# Patient Record
Sex: FEMALE | Race: WHITE | NOT HISPANIC OR LATINO | Employment: OTHER | URBAN - METROPOLITAN AREA
[De-identification: names, ages, dates, MRNs, and addresses within clinical notes are randomized per-mention and may not be internally consistent; named-entity substitution may affect disease eponyms.]

---

## 2017-04-06 ENCOUNTER — HOSPITAL ENCOUNTER (OUTPATIENT)
Dept: RADIOLOGY | Facility: MEDICAL CENTER | Age: 67
End: 2017-04-06

## 2017-04-06 ENCOUNTER — APPOINTMENT (OUTPATIENT)
Dept: RADIOLOGY | Facility: MEDICAL CENTER | Age: 67
DRG: 419 | End: 2017-04-06
Attending: EMERGENCY MEDICINE
Payer: MEDICARE

## 2017-04-06 ENCOUNTER — HOSPITAL ENCOUNTER (INPATIENT)
Facility: MEDICAL CENTER | Age: 67
LOS: 6 days | DRG: 419 | End: 2017-04-12
Attending: EMERGENCY MEDICINE | Admitting: SURGERY
Payer: MEDICARE

## 2017-04-06 DIAGNOSIS — K81.0 ACUTE CHOLECYSTITIS: ICD-10-CM

## 2017-04-06 DIAGNOSIS — E80.6 HYPERBILIRUBINEMIA: ICD-10-CM

## 2017-04-06 LAB
ABO GROUP BLD: NORMAL
ABO GROUP BLD: NORMAL
ALBUMIN SERPL BCP-MCNC: 3.5 G/DL (ref 3.2–4.9)
ALBUMIN/GLOB SERPL: 1.3 G/DL
ALP SERPL-CCNC: 182 U/L (ref 30–99)
ALT SERPL-CCNC: 124 U/L (ref 2–50)
ANION GAP SERPL CALC-SCNC: 9 MMOL/L (ref 0–11.9)
APTT PPP: 26.5 SEC (ref 24.7–36)
AST SERPL-CCNC: 152 U/L (ref 12–45)
BASOPHILS # BLD AUTO: 0.3 % (ref 0–1.8)
BASOPHILS # BLD: 0.02 K/UL (ref 0–0.12)
BILIRUB SERPL-MCNC: 9.4 MG/DL (ref 0.1–1.5)
BLD GP AB SCN SERPL QL: NORMAL
BUN SERPL-MCNC: 25 MG/DL (ref 8–22)
CALCIUM SERPL-MCNC: 8.9 MG/DL (ref 8.5–10.5)
CHLORIDE SERPL-SCNC: 99 MMOL/L (ref 96–112)
CO2 SERPL-SCNC: 22 MMOL/L (ref 20–33)
CREAT SERPL-MCNC: 1.21 MG/DL (ref 0.5–1.4)
EOSINOPHIL # BLD AUTO: 0.05 K/UL (ref 0–0.51)
EOSINOPHIL NFR BLD: 0.7 % (ref 0–6.9)
ERYTHROCYTE [DISTWIDTH] IN BLOOD BY AUTOMATED COUNT: 44.9 FL (ref 35.9–50)
GFR SERPL CREATININE-BSD FRML MDRD: 44 ML/MIN/1.73 M 2
GLOBULIN SER CALC-MCNC: 2.8 G/DL (ref 1.9–3.5)
GLUCOSE SERPL-MCNC: 78 MG/DL (ref 65–99)
HCT VFR BLD AUTO: 41.9 % (ref 37–47)
HGB BLD-MCNC: 14 G/DL (ref 12–16)
IMM GRANULOCYTES # BLD AUTO: 0.08 K/UL (ref 0–0.11)
IMM GRANULOCYTES NFR BLD AUTO: 1.1 % (ref 0–0.9)
INR PPP: 0.94 (ref 0.87–1.13)
LYMPHOCYTES # BLD AUTO: 0.63 K/UL (ref 1–4.8)
LYMPHOCYTES NFR BLD: 8.6 % (ref 22–41)
MCH RBC QN AUTO: 31.5 PG (ref 27–33)
MCHC RBC AUTO-ENTMCNC: 33.4 G/DL (ref 33.6–35)
MCV RBC AUTO: 94.4 FL (ref 81.4–97.8)
MONOCYTES # BLD AUTO: 0.69 K/UL (ref 0–0.85)
MONOCYTES NFR BLD AUTO: 9.5 % (ref 0–13.4)
NEUTROPHILS # BLD AUTO: 5.82 K/UL (ref 2–7.15)
NEUTROPHILS NFR BLD: 79.8 % (ref 44–72)
NRBC # BLD AUTO: 0 K/UL
NRBC BLD AUTO-RTO: 0 /100 WBC
PLATELET # BLD AUTO: 180 K/UL (ref 164–446)
PMV BLD AUTO: 9.7 FL (ref 9–12.9)
POTASSIUM SERPL-SCNC: 3.8 MMOL/L (ref 3.6–5.5)
PROT SERPL-MCNC: 6.3 G/DL (ref 6–8.2)
PROTHROMBIN TIME: 12.9 SEC (ref 12–14.6)
RBC # BLD AUTO: 4.44 M/UL (ref 4.2–5.4)
RH BLD: NORMAL
SODIUM SERPL-SCNC: 130 MMOL/L (ref 135–145)
WBC # BLD AUTO: 7.3 K/UL (ref 4.8–10.8)

## 2017-04-06 PROCEDURE — 85610 PROTHROMBIN TIME: CPT

## 2017-04-06 PROCEDURE — 770006 HCHG ROOM/CARE - MED/SURG/GYN SEMI*

## 2017-04-06 PROCEDURE — 302128 INFUSION PUMP: Performed by: SURGERY

## 2017-04-06 PROCEDURE — 85730 THROMBOPLASTIN TIME PARTIAL: CPT

## 2017-04-06 PROCEDURE — A9270 NON-COVERED ITEM OR SERVICE: HCPCS | Performed by: SURGERY

## 2017-04-06 PROCEDURE — 90471 IMMUNIZATION ADMIN: CPT

## 2017-04-06 PROCEDURE — 86850 RBC ANTIBODY SCREEN: CPT

## 2017-04-06 PROCEDURE — 85025 COMPLETE CBC W/AUTO DIFF WBC: CPT

## 2017-04-06 PROCEDURE — 74181 MRI ABDOMEN W/O CONTRAST: CPT

## 2017-04-06 PROCEDURE — 99285 EMERGENCY DEPT VISIT HI MDM: CPT

## 2017-04-06 PROCEDURE — 90670 PCV13 VACCINE IM: CPT | Performed by: SURGERY

## 2017-04-06 PROCEDURE — 86900 BLOOD TYPING SEROLOGIC ABO: CPT

## 2017-04-06 PROCEDURE — 700111 HCHG RX REV CODE 636 W/ 250 OVERRIDE (IP): Performed by: SURGERY

## 2017-04-06 PROCEDURE — 80053 COMPREHEN METABOLIC PANEL: CPT

## 2017-04-06 PROCEDURE — 700102 HCHG RX REV CODE 250 W/ 637 OVERRIDE(OP): Performed by: SURGERY

## 2017-04-06 PROCEDURE — 3E0234Z INTRODUCTION OF SERUM, TOXOID AND VACCINE INTO MUSCLE, PERCUTANEOUS APPROACH: ICD-10-PCS | Performed by: SURGERY

## 2017-04-06 PROCEDURE — 86901 BLOOD TYPING SEROLOGIC RH(D): CPT

## 2017-04-06 PROCEDURE — 700105 HCHG RX REV CODE 258: Performed by: EMERGENCY MEDICINE

## 2017-04-06 PROCEDURE — 700105 HCHG RX REV CODE 258: Performed by: SURGERY

## 2017-04-06 PROCEDURE — 700111 HCHG RX REV CODE 636 W/ 250 OVERRIDE (IP): Performed by: EMERGENCY MEDICINE

## 2017-04-06 RX ORDER — PHENYTOIN SODIUM 100 MG/1
100 CAPSULE, EXTENDED RELEASE ORAL
COMMUNITY

## 2017-04-06 RX ORDER — DIAZEPAM 2 MG/1
2 TABLET ORAL 2 TIMES DAILY PRN
Status: DISCONTINUED | OUTPATIENT
Start: 2017-04-06 | End: 2017-04-12 | Stop reason: HOSPADM

## 2017-04-06 RX ORDER — LORATADINE 10 MG/1
10 TABLET ORAL DAILY
Status: DISCONTINUED | OUTPATIENT
Start: 2017-04-06 | End: 2017-04-12 | Stop reason: HOSPADM

## 2017-04-06 RX ORDER — PHENYTOIN 50 MG/1
100 TABLET, CHEWABLE ORAL
Status: DISCONTINUED | OUTPATIENT
Start: 2017-04-06 | End: 2017-04-12 | Stop reason: HOSPADM

## 2017-04-06 RX ORDER — PREDNISONE 10 MG/1
10 TABLET ORAL PRN
COMMUNITY

## 2017-04-06 RX ORDER — SODIUM CHLORIDE 9 MG/ML
INJECTION, SOLUTION INTRAVENOUS CONTINUOUS
Status: DISCONTINUED | OUTPATIENT
Start: 2017-04-06 | End: 2017-04-12 | Stop reason: HOSPADM

## 2017-04-06 RX ORDER — PREDNISONE 10 MG/1
10 TABLET ORAL
Status: DISCONTINUED | OUTPATIENT
Start: 2017-04-06 | End: 2017-04-12 | Stop reason: HOSPADM

## 2017-04-06 RX ORDER — ONDANSETRON 2 MG/ML
4 INJECTION INTRAMUSCULAR; INTRAVENOUS EVERY 6 HOURS PRN
Status: DISCONTINUED | OUTPATIENT
Start: 2017-04-06 | End: 2017-04-09

## 2017-04-06 RX ORDER — DIAZEPAM 2 MG/1
2 TABLET ORAL EVERY 12 HOURS PRN
Status: ON HOLD | COMMUNITY
End: 2017-04-06

## 2017-04-06 RX ORDER — AMLODIPINE BESYLATE 10 MG/1
10 TABLET ORAL NIGHTLY
Status: DISCONTINUED | OUTPATIENT
Start: 2017-04-06 | End: 2017-04-12 | Stop reason: HOSPADM

## 2017-04-06 RX ORDER — AMLODIPINE BESYLATE 10 MG/1
10 TABLET ORAL DAILY
COMMUNITY

## 2017-04-06 RX ORDER — ATENOLOL 50 MG/1
50 TABLET ORAL
Status: DISCONTINUED | OUTPATIENT
Start: 2017-04-06 | End: 2017-04-12 | Stop reason: HOSPADM

## 2017-04-06 RX ORDER — ATENOLOL 50 MG/1
50 TABLET ORAL DAILY
COMMUNITY

## 2017-04-06 RX ORDER — CEFOTETAN DISODIUM 1 G/10ML
1 INJECTION, POWDER, FOR SOLUTION INTRAMUSCULAR; INTRAVENOUS EVERY 12 HOURS
Status: DISCONTINUED | OUTPATIENT
Start: 2017-04-06 | End: 2017-04-06

## 2017-04-06 RX ORDER — LORATADINE 10 MG/1
10 TABLET ORAL DAILY
COMMUNITY

## 2017-04-06 RX ADMIN — LORATADINE 10 MG: 10 TABLET ORAL at 10:26

## 2017-04-06 RX ADMIN — SODIUM CHLORIDE: 9 INJECTION, SOLUTION INTRAVENOUS at 17:40

## 2017-04-06 RX ADMIN — PIPERACILLIN AND TAZOBACTAM 3.38 G: 3; .375 INJECTION, POWDER, LYOPHILIZED, FOR SOLUTION INTRAVENOUS; PARENTERAL at 08:04

## 2017-04-06 RX ADMIN — PNEUMOCOCCAL 13-VALENT CONJUGATE VACCINE 0.5 ML: 2.2; 2.2; 2.2; 2.2; 2.2; 4.4; 2.2; 2.2; 2.2; 2.2; 2.2; 2.2; 2.2 INJECTION, SUSPENSION INTRAMUSCULAR at 10:27

## 2017-04-06 RX ADMIN — SODIUM CHLORIDE: 9 INJECTION, SOLUTION INTRAVENOUS at 08:04

## 2017-04-06 RX ADMIN — PIPERACILLIN AND TAZOBACTAM 3.38 G: 3; .375 INJECTION, POWDER, LYOPHILIZED, FOR SOLUTION INTRAVENOUS; PARENTERAL at 23:57

## 2017-04-06 RX ADMIN — ATENOLOL 50 MG: 50 TABLET ORAL at 10:26

## 2017-04-06 RX ADMIN — PHENYTOIN 100 MG: 50 TABLET, CHEWABLE ORAL at 20:10

## 2017-04-06 RX ADMIN — AMLODIPINE BESYLATE 10 MG: 10 TABLET ORAL at 20:10

## 2017-04-06 RX ADMIN — PIPERACILLIN AND TAZOBACTAM 3.38 G: 3; .375 INJECTION, POWDER, LYOPHILIZED, FOR SOLUTION INTRAVENOUS; PARENTERAL at 12:12

## 2017-04-06 RX ADMIN — HYDROMORPHONE HYDROCHLORIDE 1 MG: 1 INJECTION, SOLUTION INTRAMUSCULAR; INTRAVENOUS; SUBCUTANEOUS at 12:25

## 2017-04-06 RX ADMIN — PIPERACILLIN AND TAZOBACTAM 3.38 G: 3; .375 INJECTION, POWDER, LYOPHILIZED, FOR SOLUTION INTRAVENOUS; PARENTERAL at 17:38

## 2017-04-06 ASSESSMENT — LIFESTYLE VARIABLES
DO YOU DRINK ALCOHOL: YES
TOTAL SCORE: 0
EVER HAD A DRINK FIRST THING IN THE MORNING TO STEADY YOUR NERVES TO GET RID OF A HANGOVER: NO
CONSUMPTION TOTAL: NEGATIVE
HAVE YOU EVER FELT YOU SHOULD CUT DOWN ON YOUR DRINKING: NO
AVERAGE NUMBER OF DAYS PER WEEK YOU HAVE A DRINK CONTAINING ALCOHOL: 2
EVER_SMOKED: YES
ALCOHOL_USE: YES
HAVE PEOPLE ANNOYED YOU BY CRITICIZING YOUR DRINKING: NO
TOTAL SCORE: 0
AVERAGE NUMBER OF DAYS PER WEEK YOU HAVE A DRINK CONTAINING ALCOHOL: 0
ON A TYPICAL DAY WHEN YOU DRINK ALCOHOL HOW MANY DRINKS DO YOU HAVE: 0
HAVE YOU EVER FELT YOU SHOULD CUT DOWN ON YOUR DRINKING: NO
EVER FELT BAD OR GUILTY ABOUT YOUR DRINKING: NO
TOTAL SCORE: 0
HOW MANY TIMES IN THE PAST YEAR HAVE YOU HAD 5 OR MORE DRINKS IN A DAY: 0
TOTAL SCORE: 0
HAVE PEOPLE ANNOYED YOU BY CRITICIZING YOUR DRINKING: NO
TOTAL SCORE: 0
TOTAL SCORE: 0
CONSUMPTION TOTAL: INCOMPLETE
EVER HAD A DRINK FIRST THING IN THE MORNING TO STEADY YOUR NERVES TO GET RID OF A HANGOVER: NO
EVER FELT BAD OR GUILTY ABOUT YOUR DRINKING: NO

## 2017-04-06 ASSESSMENT — PAIN SCALES - GENERAL
PAINLEVEL_OUTOF10: 0

## 2017-04-06 NOTE — CARE PLAN
Problem: Safety  Goal: Will remain free from injury  Intervention: Provide assistance with mobility  Patient will ask for assistance with equipment for mobility      Problem: Knowledge Deficit  Goal: Knowledge of disease process/condition, treatment plan, diagnostic tests, and medications will improve  Intervention: Assess knowledge level of disease process/condition, treatment plan, diagnostic tests, and medications  Patient given education on ERCP to read through and will ask RN questions

## 2017-04-06 NOTE — PROGRESS NOTES
PT seen and examined  Has cholelithiasis and elevated LFTs  MRCP this AM shows CBD stone  GI consult with GIC placed  Continue IVF  Plan lap joey after ERCP

## 2017-04-06 NOTE — ED NOTES
Courtesy call to floor to notify that pt is on her way up to floor.  Pt in NAD upon leaving floor.

## 2017-04-06 NOTE — PROGRESS NOTES
"Patient resting comfortably this am some RUQ abdominal pain with palpation, denies need for anything for pain. Patient is concerned about re-starting her home medications, would like MD to re-order, RN will discuss with MD upon rounding. Patient going down for MRI this am. Plan of care discussed, all questions answered. /68 mmHg  Pulse 66  Temp(Src) 36.3 °C (97.4 °F)  Resp 18  Ht 1.651 m (5' 5\")  Wt 69.3 kg (152 lb 12.5 oz)  BMI 25.42 kg/m2  SpO2 96%  Breastfeeding? No    "

## 2017-04-06 NOTE — ED NOTES
Pt a transfer in from [facility] for acute choleycystitis.   Had 1gm rocephin, fentanyl 50mcg times 2, last dilaudid 0.5mg at 2235.  Pt states no pain at this time.

## 2017-04-06 NOTE — IP AVS SNAPSHOT
" Home Care Instructions                                                                                                                  Name:Ning Inman  Medical Record Number:[id number]  CSN: [id number]    YOB: 1950   Age: 66 y.o.  Sex: female  HT:1.651 m (5' 5\") WT: 69.3 kg (152 lb 12.5 oz)          Admit Date: 4/6/2017     Discharge Date:   Today's Date: 4/12/2017  Attending Doctor:  Indu Lynn M.D.                  Allergies:  Hyzaar            Discharge Instructions       Discharge Instructions    Discharged to home by car with relative. Discharged via walking, hospital escort: Refused.  Special equipment needed: Walker    Be sure to schedule a follow-up appointment with your primary care doctor or any specialists as instructed.     Discharge Plan:   Smoking Cessation Offered: Patient Counseled  Pneumococcal Vaccine Given - only chart on this line when given: Given (See MAR)  Influenza Vaccine Indication: Patient Refuses    I understand that a diet low in cholesterol, fat, and sodium is recommended for good health. Unless I have been given specific instructions below for another diet, I accept this instruction as my diet prescription.   Other diet: regular    Special Instructions:   DIET: Upon discharge from the hospital you may resume your normal preoperative diet. Depending on how you are feeling and whether you have nausea or not, you may wish to stay with a bland diet for the first few days. However, you can advance this as quickly as you feel ready.     ACTIVITIES: After discharge from the hospital, you may resume full routine activities. However, there should be no heavy lifting (greater than 15 pounds) and no strenuous activities until after your follow-up visit. Otherwise, routine activities of daily living are acceptable.     DRIVING: You may drive whenever you are off pain medications and are able to perform the activities needed to drive, i.e. turning, bending, twisting, etc.     "     BATHING: You may get the wound wet at any time after leaving the hospital. You may shower, but do not submerge in a bath for at least a week. Dressings may come off after 48 hours.     BOWEL FUNCTION: A few patients, after this operation, will develop either frequent or loose stools after meals. This usually corrects itself after a few days, to a few weeks. If this occurs, do not worry; it is not unusual and will resolve. Much more common than loose stools, is constipation. The combination of pain medication and decreased activity level can cause constipation in otherwise normal patients. If you feel this is occurring, take a laxative (Milk of Magnesia, Ex-Lax, Senokot, etc.) until the problem has resolved.     PAIN MEDICATION: You will be given a prescription for pain medication at discharge. Please take these as directed. It is important to remember not to take medications on an empty stomach as this may cause nausea.     CALL IF YOU HAVE: (1) Fevers to more than 1010 F, (2) Unusual chest or leg pain, (3) Drainage or fluid from incision that may be foul smelling, increased tenderness or soreness at the wound or the wound edges are no longer together, redness or swelling at the incision site. Please do not hesitate to call with any other questions.     APPOINTMENT: Contact our office at [phone] for a follow-up appointment in 1 week following your procedure.     · Is patient discharged on Warfarin / Coumadin?   No     · Is patient Post Blood Transfusion?  No    Depression / Suicide Risk    As you are discharged from this [facility] facility, it is important to learn how to keep safe from harming yourself.    Recognize the warning signs:  · Abrupt changes in personality, positive or negative- including increase in energy   · Giving away possessions  · Change in eating patterns- significant weight changes-  positive or negative  · Change in sleeping patterns- unable to sleep or sleeping all the  time   · Unwillingness or inability to communicate  · Depression  · Unusual sadness, discouragement and loneliness  · Talk of wanting to die  · Neglect of personal appearance   · Rebelliousness- reckless behavior  · Withdrawal from people/activities they love  · Confusion- inability to concentrate     If you or a loved one observes any of these behaviors or has concerns about self-harm, here's what you can do:  · Talk about it- your feelings and reasons for harming yourself  · Remove any means that you might use to hurt yourself (examples: pills, rope, extension cords, firearm)  · Get professional help from the community (Mental Health, Substance Abuse, psychological counseling)  · Do not be alone:Call your Safe Contact- someone whom you trust who will be there for you.  · Call your local CRISIS HOTLINE 740-6254 or 492-798-8435  · Call your local Children's Mobile Crisis Response Team [place] (620) 042-7608 or www.[employer]  · Call the toll free National Suicide Prevention Hotlines   · National Suicide Prevention Lifeline 836-856-ZLSO (6803)  · National Hope Line Network 800-SUICIDE (332-2852)      Endoscopic Retrograde Cholangiopancreatography (ERCP)  Endoscopic retrograde cholangiopancreatography (ERCP) is a procedure used to diagnosis many diseases of the pancreas, bile ducts, liver, and gallbladder. During ERCP a thin, lighted tube (endoscope) is passed through the mouth and down the back of the throat into the first part of the small intestine (duodenum). A small, plastic tube (cannula) is then passed through the endoscope and directed into the bile duct or pancreatic duct. Dye is then injected through the cannula and X-rays are taken to study the biliary and pancreatic passageways.   LET YOUR HEALTH CARE PROVIDER KNOW ABOUT:   · Any allergies you have.    · All medicines you are taking, including vitamins, herbs, eyedrops, creams, and over-the-counter medicines.    · Previous problems you or  members of your family have had with the use of anesthetics.    · Any blood disorders you have.    · Previous surgeries you have had.    · Medical conditions you have.  RISKS AND COMPLICATIONS  Generally, ERCP is a safe procedure. However, as with any procedure, complications can occur. A simple removal of gallstones has the lowest rate of complications. Higher rates of complication occur in people who have poorly functioning bile or pancreatic ducts. Possible complications include:   · Pancreatitis.  · Bleeding.  · Accidental punctures in the bowel wall, pancreas, or gall bladder.  · Gall bladder or bile duct infection.  BEFORE THE PROCEDURE   · Do not eat or drink anything, including water, for at least 8 hours before the procedure or as directed by your health care provider.    · Ask your health care provider whether you should stop taking certain medicines prior to your procedure.    · Arrange for someone to drive you home. You will not be allowed to drive for 12-24 hours after the procedure.  PROCEDURE   · You will be given medicine through a vein (intravenously) to make you relaxed and sleepy.    · You might have a breathing tube placed to give you medicine that makes you sleep (general anesthetic).    · Your throat may be sprayed with medicine that numbs the area and prevents gagging (local anesthetic), or you may gargle this medicine.    · You will lie on your left side.    · The endoscope will be inserted through your mouth and into the duodenum. The tube will not interfere with your breathing. Gagging is prevented by the anesthesia.    · While X-rays are being taken, you may be positioned on your stomach.    · A small sample of tissue (biopsy) may be removed for examination.  AFTER THE PROCEDURE   · You will rest in bed until you are fully conscious.    · When you first wake up, your throat may feel slightly sore.    · You will not be allowed to eat or drink until numbness subsides.    · Once you are able  to drink, urinate, and sit on the edge of the bed without feeling sick to your stomach (nauseous) or dizzy, you may be allowed to go home.     This information is not intended to replace advice given to you by your health care provider. Make sure you discuss any questions you have with your health care provider.     Document Released: 09/12/2002 Document Revised: 10/08/2014 Document Reviewed: 07/29/2014  [employer] Interactive Patient Education ©2016 [employer]    Laparoscopic Cholecystectomy, Care After  Refer to this sheet in the next few weeks. These instructions provide you with information on caring for yourself after your procedure. Your health care provider may also give you more specific instructions. Your treatment has been planned according to current medical practices, but problems sometimes occur. Call your health care provider if you have any problems or questions after your procedure.  WHAT TO EXPECT AFTER THE PROCEDURE  After your procedure, it is typical to have the following:  · Pain at your incision sites. You will be given pain medicines to control the pain.  · Mild nausea or vomiting. This should improve after the first 24 hours.  · Bloating and possibly shoulder pain from the gas used during the procedure. This will improve after the first 24 hours.  HOME CARE INSTRUCTIONS   · Change bandages (dressings) as directed by your health care provider.  · Keep the wound dry and clean. You may wash the wound gently with soap and water. Gently blot or dab the area dry.  · Do not take baths or use swimming pools or hot tubs for 2 weeks or until your health care provider approves.  · Only take over-the-counter or prescription medicines as directed by your health care provider.  · Continue your normal diet as directed by your health care provider.  · Do not lift anything heavier than 10 pounds (4.5 kg) until your health care provider approves.  · Do not play contact sports for 1 week or until your health  care provider approves.  SEEK MEDICAL CARE IF:   · You have redness, swelling, or increasing pain in the wound.  · You notice yellowish-white fluid (pus) coming from the wound.  · You have drainage from the wound that lasts longer than 1 day.  · You notice a bad smell coming from the wound or dressing.  · Your surgical cuts (incisions) break open.  SEEK IMMEDIATE MEDICAL CARE IF:   · You develop a rash.  · You have difficulty breathing.  · You have chest pain.  · You have a fever.  · You have increasing pain in the shoulders (shoulder strap areas).  · You have dizzy episodes or faint while standing.  · You have severe abdominal pain.  · You feel sick to your stomach (nauseous) or throw up (vomit) and this lasts for more than 1 day.     This information is not intended to replace advice given to you by your health care provider. Make sure you discuss any questions you have with your health care provider.     Document Released: 12/18/2006 Document Revised: 10/08/2014 Document Reviewed: 07/30/2014  [employer] Interactive Patient Education ©2016 [employer].        Follow-up Information     1. Follow up with Indu Lynn M.D.. Schedule an appointment as soon as possible for a visit in 1 week.    Specialty:  Surgery    Why:  For post operative follow up    Contact information    [id number]  [place] [id number] [phone]           Discharge Medication Instructions:    Below are the medications your physician expects you to take upon discharge:    Review all your home medications and newly ordered medications with your doctor and/or pharmacist. Follow medication instructions as directed by your doctor and/or pharmacist.    Please keep your medication list with you and share with your physician.               Medication List      START taking these medications        Instructions    Morning Afternoon Evening Bedtime    hydrocodone-acetaminophen 5-325 MG Tabs per tablet   Last time this was given:  2 Tabs  on 4/11/2017  8:01 PM   Commonly known as:  NORCO        Take 1-2 Tabs by mouth every four hours as needed (PAIN).   Dose:  1-2 Tab                          CONTINUE taking these medications        Instructions    Morning Afternoon Evening Bedtime    amlodipine 10 MG Tabs   Last time this was given:  10 mg on 4/9/2017  9:09 PM   Commonly known as:  NORVASC        Take 10 mg by mouth every day.   Dose:  10 mg                        atenolol 50 MG Tabs   Last time this was given:  50 mg on 4/12/2017  9:43 AM   Commonly known as:  TENORMIN        Take 50 mg by mouth every day.   Dose:  50 mg                        loratadine 10 MG Tabs   Last time this was given:  10 mg on 4/12/2017  9:42 AM   Commonly known as:  CLARITIN        Take 10 mg by mouth every day.   Dose:  10 mg                        phenytoin  MG Caps   Commonly known as:  DILANTIN        Take 100 mg by mouth every bedtime.   Dose:  100 mg                        predniSONE 10 MG Tabs   Commonly known as:  DELTASONE        Take 10 mg by mouth as needed.   Dose:  10 mg                             Where to Get Your Medications      Information about where to get these medications is not yet available     ! Ask your nurse or doctor about these medications    - hydrocodone-acetaminophen 5-325 MG Tabs per tablet            Orders for after discharge     DME Walker    Complete by:  As directed              Instructions           Diet / Nutrition:    Follow any diet instructions given to you by your doctor or the dietician, including how much salt (sodium) you are allowed each day.    If you are overweight, talk to your doctor about a weight reduction plan.    Activity:    Remain physically active following your doctor's instructions about exercise and activity.    Rest often.     Any time you become even a little tired or short of breath, SIT DOWN and rest.    Worsening Symptoms:    Report any of the following signs and symptoms to the doctor's office  immediately:    *Pain of jaw, arm, or neck  *Chest pain not relieved by medication                               *Dizziness or loss of consciousness  *Difficulty breathing even when at rest   *More tired than usual                                       *Bleeding drainage or swelling of surgical site  *Swelling of feet, ankles, legs or stomach                 *Fever (>100ºF)  *Pink or blood tinged sputum  *Weight gain (3lbs/day or 5lbs /week)           *Shock from internal defibrillator (if applicable)  *Palpitations or irregular heartbeats                *Cool and/or numb extremities    Stroke Awareness    Common Risk Factors for Stroke include:    Age  Atrial Fibrillation  Carotid Artery Stenosis  Diabetes Mellitus  Excessive alcohol consumption  High blood pressure  Overweight   Physical inactivity  Smoking    Warning signs and symptoms of a stroke include:    *Sudden numbness or weakness of the face, arm or leg (especially on one side of the body).  *Sudden confusion, trouble speaking or understanding.  *Sudden trouble seeing in one or both eyes.  *Sudden trouble walking, dizziness, loss of balance or coordination.Sudden severe headache with no known cause.    It is very important to get treatment quickly when a stroke occurs. If you experience any of the above warning signs, call 911 immediately.                   Disclaimer         Quit Smoking / Tobacco Use:    I understand the use of any tobacco products increases my chance of suffering from future heart disease or stroke and could cause other illnesses which may shorten my life. Quitting the use of tobacco products is the single most important thing I can do to improve my health. For further information on smoking / tobacco cessation call a Toll Free Quit Line at 1-456.127.8717 (*National [employer] [place]) or 1-721.337.5050 (American Lung Association) or you can access the web based program at www.lungusa.org.    [place] Tobacco Users Help Line:  (462)  879-2627       Toll Free: 5-013-882-0812  Quit Tobacco Program [facility] ([phone]    Crisis Hotline:    [place] Crisis Hotline:  8-635-YBAOASJ or 1-408.424.1022    [place] Crisis Hotline:    1-758.829.8410 or 989-304-7896    Discharge Survey:   Thank you for choosing [facility]. We hope we did everything we could to make your hospital stay a pleasant one. You may be receiving a phone survey and we would appreciate your time and participation in answering the questions. Your input is very valuable to us in our efforts to improve our service to our patients and their families.        My signature on this form indicates that:    1. I have reviewed and understand the above information.  2. My questions regarding this information have been answered to my satisfaction.  3. I have formulated a plan with my discharge nurse to obtain my prescribed medications for home.                  Disclaimer         __________________________________                     __________       ________                       Patient Signature                                                 Date                    Time

## 2017-04-06 NOTE — IP AVS SNAPSHOT
4/12/2017          Ning Inman  [address]  [place] [address]    Dear Ning:    [facility] wants to ensure your discharge home is safe and you or your loved ones have had all your questions answered regarding your care after you leave the hospital.    You may receive a telephone call within two days of your discharge.  This call is to make certain you understand your discharge instructions as well as ensure we provided you with the best care possible during your stay with us.     The call will only last approximately 3-5 minutes and will be done by a nurse.    Once again, we want to ensure your discharge home is safe and that you have a clear understanding of any next steps in your care.  If you have any questions or concerns, please do not hesitate to contact us, we are here for you.  Thank you for choosing [facility] for your healthcare needs.    Sincerely,    Vadim Salmon    [facility]

## 2017-04-06 NOTE — ED PROVIDER NOTES
"ED Provider Note    CHIEF COMPLAINT  Chief Complaint   Patient presents with   • Gallbladder       HPI  Ning Inman is a 66 y.o. female who presents to ED after transfer from [facility] for concern for acute cholecystitis. Patient reports ruq sharp pain x 4 days associated with nausea/vomiting. Normal bowel movements. No fevers/chills. Feels better after IV meds. Given Ceftriaxone prior to transfer. Denies blood thinner medications.     OSH CT:  Distended GB with pericholecystic fluid and thickened gallbladder wall and CBD dilated to 10 mm c/w acute cholecystitis.   OSH labs:  WBC 8.16  Creatinine 1.54  Total Bili 10.9  Alk erica 242      Lipase 114    REVIEW OF SYSTEMS  See HPI for further details. All other systems are negative.     PAST MEDICAL HISTORY   has a past medical history of Seizure disorder (CMS-HCC) and Hypertension.    SOCIAL HISTORY  Social History     Social History Main Topics   • Smoking status: Current Every Day Smoker -- 1.00 packs/day     Types: Cigarettes   • Smokeless tobacco: Not on file   • Alcohol Use: No   • Drug Use: No   • Sexual Activity: Not on file       SURGICAL HISTORY  patient denies any surgical history    CURRENT MEDICATIONS  Home Medications     **Home medications have not yet been reviewed for this encounter**          ALLERGIES  Allergies   Allergen Reactions   • Hyzaar [Losartan Potassium-Hctz]      Dizziness         PHYSICAL EXAM  VITAL SIGNS: /63 mmHg  Pulse 70  Temp(Src) 37 °C (98.6 °F)  Resp 18  Ht 1.651 m (5' 5\")  Wt 66.225 kg (146 lb)  BMI 24.30 kg/m2 @FRANCIS[[id number]::@   Pulse ox interpretation:  I interpret this pulse ox as normal.  Constitutional: Alert in no apparent distress.  HENT: No signs of trauma, Bilateral external ears normal, Nose normal.   Eyes: Pupils are equal and reactive, Conjunctiva normal, mild scleral icterus    Neck: Normal range of motion, No tenderness, Supple, No stridor. No JVD.  Lymphatic: No " lymphadenopathy noted.   Cardiovascular: Regular rate and rhythm, no murmurs.   Thorax & Lungs: Normal breath sounds, No respiratory distress, No wheezing, No chest tenderness.   Abdomen: Bowel sounds normal, Soft, Mild RUQ tenderness, no rebound tenderness or guarding, No masses, No pulsatile masses. No peritoneal signs.  Skin: Warm, Dry, No erythema, No rash.   Back: No bony tenderness, No CVA tenderness.   Extremities: Intact distal pulses, No edema, No tenderness,   Musculoskeletal: Good range of motion in all major joints. No tenderness to palpation or major deformities noted.   Neurologic: Alert , Normal motor function, Normal sensory function, No focal deficits noted.   Psychiatric: Affect normal, Judgment normal, Mood normal.       COURSE & MEDICAL DECISION MAKING  Pertinent Labs & Imaging studies reviewed. (See chart for details)  Non toxic in appearance. Afebrile. HD stable. Low suspicion for sepsis. Concern for possible CBD stone - d/w Dr. Lynn and recommends MRCP and Abx. Admitted to Dr. Lynn service w/o any obvious distress and stable vital signs. Recommendations followed.     1:05 AM  Consulted Dr. Lynn and recommends MRCP and admission to her service. Recommend Cefotan abx. She will consult GI as needed per discussion.       FINAL IMPRESSION  1. Acute cholecystitis      2. Hyperbilirubinemia             Electronically signed by: Jae Ibrahim, 4/6/2017 12:48 AM

## 2017-04-06 NOTE — IP AVS SNAPSHOT
Net 263 Access Code: Activation code not generated  Current Net 263 Status: Patient Declined    Your email address is not on file at [facility].  Email Addresses are required for you to sign up for Net 263, please contact [phone] to verify your personal information and to provide your email address prior to attempting to register for Net 263.    Ning Inman  [address] [place] [address]    Net 263  A secure, online tool to manage your health information     [facility]’s Net 263® is a secure, online tool that connects you to your personalized health information from the privacy of your home -- day or night - making it very easy for you to manage your healthcare. Once the activation process is completed, you can even access your medical information using the Net 263 jamar, which is available for free in the Apple Jamar store or Google Play store.     To learn more about Net 263, visit [email or web address]    There are two levels of access available (as shown below):   My Chart Features  [facility] Primary Care Doctor [facility]  Specialists [facility]  Urgent  Care Non-[facility] Primary Care Doctor   Email your healthcare team securely and privately 24/7 X X X    Manage appointments: schedule your next appointment; view details of past/upcoming appointments X      Request prescription refills. X      View recent personal medical records, including lab and immunizations X X X X   View health record, including health history, allergies, medications X X X X   Read reports about your outpatient visits, procedures, consult and ER notes X X X X   See your discharge summary, which is a recap of your hospital and/or ER visit that includes your diagnosis, lab results, and care plan X X  X     How to register for [facility]:  Once your e-mail address has been verified, follow the following steps to sign up for [facility].     1. Go to  [email or web address]  2. Click on the Sign Up Now box, which takes you to the New Member  Sign Up page. You will need to provide the following information:  a. Enter your Click Quote Save Access Code exactly as it appears at the top of this page. (You will not need to use this code after you’ve completed the sign-up process. If you do not sign up before the expiration date, you must request a new code.)   b. Enter your date of birth.   c. Enter your home email address.   d. Click Submit, and follow the next screen’s instructions.  3. Create a Click Quote Save ID. This will be your Click Quote Save login ID and cannot be changed, so think of one that is secure and easy to remember.  4. Create a [employer] Save password. You can change your password at any time.  5. Enter your Password Reset Question and Answer. This can be used at a later time if you forget your password.   6. Enter your e-mail address. This allows you to receive e-mail notifications when new information is available in Click Qu[employer] Save.  7. Click Sign Up. You can now view your health information.    For assistance activating your [employer] account, call [phone]

## 2017-04-06 NOTE — IP AVS SNAPSHOT
" <p align=\"LEFT\"><IMG SRC=\"[email or web address]\" alt=\"Image\" WIDTH=\"50%\" HEIGHT=\"200\" BORDER=\"\"></p>                   Name:Ning Inman  Medical Record Number:[id number]  CSN: [id number]    YOB: 1950   Age: 66 y.o.  Sex: female  HT:1.651 m (5' 5\") WT: 69.3 kg (152 lb 12.5 oz)          Admit Date: 4/6/2017     Discharge Date:   Today's Date: 4/12/2017  Attending Doctor:  Indu Lynn M.D.                  Allergies:  Hyzaar          Follow-up Information     1. Follow up with Indu Lynn M.D.. Schedule an appointment as soon as possible for a visit in 1 week.    Specialty:  Surgery    Why:  For post operative follow up    Contact information    [address] [facility] [address]  [facility] [id number] [phone]           Medication List      Take these Medications        Instructions    amlodipine 10 MG Tabs   Commonly known as:  NORVASC    Take 10 mg by mouth every day.   Dose:  10 mg       atenolol 50 MG Tabs   Commonly known as:  TENORMIN    Take 50 mg by mouth every day.   Dose:  50 mg       hydrocodone-acetaminophen 5-325 MG Tabs per tablet   Commonly known as:  NORCO    Take 1-2 Tabs by mouth every four hours as needed (PAIN).   Dose:  1-2 Tab       loratadine 10 MG Tabs   Commonly known as:  CLARITIN    Take 10 mg by mouth every day.   Dose:  10 mg       phenytoin  MG Caps   Commonly known as:  DILANTIN    Take 100 mg by mouth every bedtime.   Dose:  100 mg       predniSONE 10 MG Tabs   Commonly known as:  DELTASONE    Take 10 mg by mouth as needed.   Dose:  10 mg         "

## 2017-04-07 LAB
ALBUMIN SERPL BCP-MCNC: 3.1 G/DL (ref 3.2–4.9)
ALBUMIN/GLOB SERPL: 1.4 G/DL
ALP SERPL-CCNC: 175 U/L (ref 30–99)
ALT SERPL-CCNC: 82 U/L (ref 2–50)
ANION GAP SERPL CALC-SCNC: 8 MMOL/L (ref 0–11.9)
AST SERPL-CCNC: 129 U/L (ref 12–45)
BASOPHILS # BLD AUTO: 0.8 % (ref 0–1.8)
BASOPHILS # BLD: 0.04 K/UL (ref 0–0.12)
BILIRUB SERPL-MCNC: 3.9 MG/DL (ref 0.1–1.5)
BUN SERPL-MCNC: 16 MG/DL (ref 8–22)
CALCIUM SERPL-MCNC: 8.1 MG/DL (ref 8.5–10.5)
CHLORIDE SERPL-SCNC: 106 MMOL/L (ref 96–112)
CO2 SERPL-SCNC: 21 MMOL/L (ref 20–33)
CREAT SERPL-MCNC: 0.89 MG/DL (ref 0.5–1.4)
EOSINOPHIL # BLD AUTO: 0.14 K/UL (ref 0–0.51)
EOSINOPHIL NFR BLD: 2.9 % (ref 0–6.9)
ERYTHROCYTE [DISTWIDTH] IN BLOOD BY AUTOMATED COUNT: 46.5 FL (ref 35.9–50)
GFR SERPL CREATININE-BSD FRML MDRD: >60 ML/MIN/1.73 M 2
GLOBULIN SER CALC-MCNC: 2.2 G/DL (ref 1.9–3.5)
GLUCOSE SERPL-MCNC: 66 MG/DL (ref 65–99)
HCT VFR BLD AUTO: 37.1 % (ref 37–47)
HGB BLD-MCNC: 12 G/DL (ref 12–16)
IMM GRANULOCYTES # BLD AUTO: 0.03 K/UL (ref 0–0.11)
IMM GRANULOCYTES NFR BLD AUTO: 0.6 % (ref 0–0.9)
LYMPHOCYTES # BLD AUTO: 0.6 K/UL (ref 1–4.8)
LYMPHOCYTES NFR BLD: 12.5 % (ref 22–41)
MCH RBC QN AUTO: 31.2 PG (ref 27–33)
MCHC RBC AUTO-ENTMCNC: 32.3 G/DL (ref 33.6–35)
MCV RBC AUTO: 96.4 FL (ref 81.4–97.8)
MONOCYTES # BLD AUTO: 0.46 K/UL (ref 0–0.85)
MONOCYTES NFR BLD AUTO: 9.6 % (ref 0–13.4)
NEUTROPHILS # BLD AUTO: 3.54 K/UL (ref 2–7.15)
NEUTROPHILS NFR BLD: 73.6 % (ref 44–72)
NRBC # BLD AUTO: 0 K/UL
NRBC BLD AUTO-RTO: 0 /100 WBC
PLATELET # BLD AUTO: 148 K/UL (ref 164–446)
PMV BLD AUTO: 10.5 FL (ref 9–12.9)
POTASSIUM SERPL-SCNC: 3.3 MMOL/L (ref 3.6–5.5)
PROT SERPL-MCNC: 5.3 G/DL (ref 6–8.2)
RBC # BLD AUTO: 3.85 M/UL (ref 4.2–5.4)
SODIUM SERPL-SCNC: 135 MMOL/L (ref 135–145)
WBC # BLD AUTO: 4.8 K/UL (ref 4.8–10.8)

## 2017-04-07 PROCEDURE — 700105 HCHG RX REV CODE 258: Performed by: EMERGENCY MEDICINE

## 2017-04-07 PROCEDURE — 700111 HCHG RX REV CODE 636 W/ 250 OVERRIDE (IP): Performed by: EMERGENCY MEDICINE

## 2017-04-07 PROCEDURE — 80053 COMPREHEN METABOLIC PANEL: CPT

## 2017-04-07 PROCEDURE — A9270 NON-COVERED ITEM OR SERVICE: HCPCS | Performed by: SURGERY

## 2017-04-07 PROCEDURE — 85025 COMPLETE CBC W/AUTO DIFF WBC: CPT

## 2017-04-07 PROCEDURE — 700111 HCHG RX REV CODE 636 W/ 250 OVERRIDE (IP): Performed by: SURGERY

## 2017-04-07 PROCEDURE — 700102 HCHG RX REV CODE 250 W/ 637 OVERRIDE(OP): Performed by: SURGERY

## 2017-04-07 PROCEDURE — 700105 HCHG RX REV CODE 258: Performed by: SURGERY

## 2017-04-07 PROCEDURE — 36415 COLL VENOUS BLD VENIPUNCTURE: CPT

## 2017-04-07 PROCEDURE — 770006 HCHG ROOM/CARE - MED/SURG/GYN SEMI*

## 2017-04-07 RX ADMIN — PIPERACILLIN AND TAZOBACTAM 3.38 G: 3; .375 INJECTION, POWDER, LYOPHILIZED, FOR SOLUTION INTRAVENOUS; PARENTERAL at 05:17

## 2017-04-07 RX ADMIN — HYDROMORPHONE HYDROCHLORIDE 1 MG: 1 INJECTION, SOLUTION INTRAMUSCULAR; INTRAVENOUS; SUBCUTANEOUS at 05:55

## 2017-04-07 RX ADMIN — HYDROMORPHONE HYDROCHLORIDE 1 MG: 1 INJECTION, SOLUTION INTRAMUSCULAR; INTRAVENOUS; SUBCUTANEOUS at 22:37

## 2017-04-07 RX ADMIN — PHENYTOIN 100 MG: 50 TABLET, CHEWABLE ORAL at 22:33

## 2017-04-07 RX ADMIN — SODIUM CHLORIDE: 9 INJECTION, SOLUTION INTRAVENOUS at 14:26

## 2017-04-07 RX ADMIN — PIPERACILLIN AND TAZOBACTAM 3.38 G: 3; .375 INJECTION, POWDER, LYOPHILIZED, FOR SOLUTION INTRAVENOUS; PARENTERAL at 11:43

## 2017-04-07 RX ADMIN — LORATADINE 10 MG: 10 TABLET ORAL at 10:12

## 2017-04-07 RX ADMIN — AMLODIPINE BESYLATE 10 MG: 10 TABLET ORAL at 22:33

## 2017-04-07 RX ADMIN — SODIUM CHLORIDE: 9 INJECTION, SOLUTION INTRAVENOUS at 03:20

## 2017-04-07 RX ADMIN — PIPERACILLIN AND TAZOBACTAM 3.38 G: 3; .375 INJECTION, POWDER, LYOPHILIZED, FOR SOLUTION INTRAVENOUS; PARENTERAL at 17:07

## 2017-04-07 RX ADMIN — HYDROMORPHONE HYDROCHLORIDE 1 MG: 1 INJECTION, SOLUTION INTRAMUSCULAR; INTRAVENOUS; SUBCUTANEOUS at 17:07

## 2017-04-07 RX ADMIN — SODIUM CHLORIDE: 9 INJECTION, SOLUTION INTRAVENOUS at 22:37

## 2017-04-07 RX ADMIN — HYDROMORPHONE HYDROCHLORIDE 1 MG: 1 INJECTION, SOLUTION INTRAMUSCULAR; INTRAVENOUS; SUBCUTANEOUS at 11:43

## 2017-04-07 RX ADMIN — ATENOLOL 50 MG: 50 TABLET ORAL at 10:12

## 2017-04-07 ASSESSMENT — ENCOUNTER SYMPTOMS
COUGH: 0
FEVER: 0
DIARRHEA: 0
SHORTNESS OF BREATH: 0
VOMITING: 0
ABDOMINAL PAIN: 1
NAUSEA: 0
CHILLS: 0
NAUSEA: 1
ABDOMINAL PAIN: 0

## 2017-04-07 ASSESSMENT — PAIN SCALES - GENERAL
PAINLEVEL_OUTOF10: 3
PAINLEVEL_OUTOF10: 0
PAINLEVEL_OUTOF10: 4
PAINLEVEL_OUTOF10: 5
PAINLEVEL_OUTOF10: 0
PAINLEVEL_OUTOF10: 0
PAINLEVEL_OUTOF10: 2

## 2017-04-07 NOTE — PROGRESS NOTES
Received report from day shift RN. Pt A&O x 4. Pt denies pain, nausea, vomiting, chest pain, shortness of breath at this time. Pt on RA. Pt will be on NPO after midnight for ERCP tomorrow. Pt tolerating clear liquid diet. -BM, BS x 4 normoactive. +void. Pt resting in bed comfortably. Plan of care discussed with pt. All needs are met at this time. Call light within reach. Bed locked and in lowest position.

## 2017-04-07 NOTE — DIETARY
"Nutrition Services: Poor PO Intake, pta. This is a 66 y.o female with admit dx: Acute cholecystitis  Ht: 5'5\"  Wt: 69.3 kg  BMI: 25.42 kg/m2  Patient with sharp painx 4-5 days associated with nausea/vomiting. The pain was persistent, not related to meals, per MD note   Labs: K: 3.3 (L)  Meds: Dilantin  IVF: NS Infusion at 125 ml/hr  No skin breakdown is noted per chart  Plan/Recommendations: Advance the diet when medically feasible, fluids per MD, Replete electrolytes prn   RD monitoring       "

## 2017-04-07 NOTE — PROGRESS NOTES
Gastroenterology Progress Note     Author: Kobe Salazar   Date & Time Created: 4/7/2017 11:22 AM    Interval History:  CC - Choledochalithiasis    Feeling better. Minimal pain. Still nausea and malaise. Pleasant - visiting with daughter    Review of Systems:  Review of Systems   Constitutional: Negative for fever and chills.   Cardiovascular: Negative for chest pain.   Gastrointestinal: Positive for nausea and abdominal pain. Negative for vomiting, diarrhea and melena.       Physical Exam:  Physical Exam   Constitutional: She is oriented to person, place, and time. She appears well-developed and well-nourished. No distress.   HENT:   Head: Atraumatic.   Eyes: EOM are normal.   Cardiovascular: Normal rate and regular rhythm.  Exam reveals no friction rub.    No murmur heard.  Pulmonary/Chest: Effort normal and breath sounds normal. No respiratory distress.   Abdominal: Soft. She exhibits no distension. There is no tenderness. There is no rebound.   Musculoskeletal: She exhibits no edema.   Neurological: She is alert and oriented to person, place, and time.   Skin: Skin is warm and dry.   Psychiatric: She has a normal mood and affect. Her behavior is normal. Thought content normal.       Labs:        Invalid input(s): [id number]      Recent Labs      04/06/17 0140 04/07/17 0213   SODIUM  130*  135   POTASSIUM  3.8  3.3*   CHLORIDE  99  106   CO2  22  21   BUN  25*  16   CREATININE  1.21  0.89   CALCIUM  8.9  8.1*     Recent Labs      04/06/17 0140 04/07/17 0213   ALTSGPT  124*  82*   ASTSGOT  152*  129*   ALKPHOSPHAT  182*  175*   TBILIRUBIN  9.4*  3.9*   GLUCOSE  78  66     Recent Labs      04/06/17 0140 04/07/17 0214   RBC  4.44  3.85*   HEMOGLOBIN  14.0  12.0   HEMATOCRIT  41.9  37.1   PLATELETCT  180  148*   PROTHROMBTM  12.9   --    APTT  26.5   --    INR  0.94   --      Recent Labs      04/06/17 0140 04/07/17 0213 04/07/17 0214   WBC  7.3   --   4.8   NEUTSPOLYS  79.80*   --    73.60*   LYMPHOCYTES  8.60*   --   12.50*   MONOCYTES  9.50   --   9.60   EOSINOPHILS  0.70   --   2.90   BASOPHILS  0.30   --   0.80   ASTSGOT  152*  129*   --    ALTSGPT  124*  82*   --    ALKPHOSPHAT  182*  175*   --    TBILIRUBIN  9.4*  3.9*   --      Hemodynamics:  Temp (24hrs), Av.6 °C (97.9 °F), Min:36.4 °C (97.6 °F), Max:36.7 °C (98.1 °F)  Temperature: 36.5 °C (97.7 °F)  Pulse  Av.7  Min: 60  Max: 74   Blood Pressure : 108/61 mmHg     Respiratory:    Respiration: 16, Pulse Oximetry: 90 %        RUL Breath Sounds: Clear, RML Breath Sounds: Clear, RLL Breath Sounds: Diminished, SHIRIN Breath Sounds: Clear, LLL Breath Sounds: Diminished  Fluids:    Intake/Output Summary (Last 24 hours) at 17 1122  Last data filed at 17 0900   Gross per 24 hour   Intake   2575 ml   Output      0 ml   Net   2575 ml        GI/Nutrition:  Orders Placed This Encounter   Procedures   • DIET ORDER     Standing Status: Standing      Number of Occurrences: 1      Standing Expiration Date:      Order Specific Question:  Diet:     Answer:  Clear Liquids - No Red Foods [12]     Medical Decision Making, by Problem:  Active Hospital Problems    Diagnosis   • Acute cholecystitis [K81.0]     Impression and Plan -     1) Choledochalith - await ERCP which is planned for tomorrow at 3:30. Discussed the procedure with her again and answered questions.   2) Cholecystitis - will need lap joey after ERCP  3) Elevated liver enzymes    Will follow    Kobe Salazar MD        Core Measures

## 2017-04-07 NOTE — PROGRESS NOTES
Pt A&OX4. VSS. SCD's implemented. Pt denies chest pain/shortness of breath. Pt denies numbness/tingling. Pt remains strict NPO this AM for ERCP. Pt denies nausea/vomiting. Pt voiding. Pt ambulates with stand by assistance with steady gait. Pt denies pain this AM, continue to monitor pain level and provide intervention as needed. Family at bedside. Plan of care reviewed. Bed in lowest position. Call light within reach. Continue to monitor.

## 2017-04-07 NOTE — PROGRESS NOTES
Surgical Progress Note    Author: Fabiola Jeter Date & Time created: 2017   8:43 AM     Interval Events:  HD #1 admitted with choledocholithiasis    MRCP yesterday +CBD stones.  GI will do ERCP tomorrow.  Plan cholecystectomy  am.  Clear liquids until midnight .  Mobilize patient.     Review of Systems   Constitutional: Negative for fever and chills.   Respiratory: Negative for cough and shortness of breath.    Gastrointestinal: Negative for nausea, vomiting and abdominal pain.   All other systems reviewed and are negative.    Hemodynamics:  Temp (24hrs), Av.6 °C (97.9 °F), Min:36.4 °C (97.6 °F), Max:36.7 °C (98.1 °F)  Temperature: 36.7 °C (98.1 °F)  Pulse  Av.9  Min: 60  Max: 74   Blood Pressure : 122/63 mmHg     Respiratory:    Respiration: 17, Pulse Oximetry: 98 %        RUL Breath Sounds: Clear, RML Breath Sounds: Clear, RLL Breath Sounds: Diminished, SHIRIN Breath Sounds: Clear, LLL Breath Sounds: Diminished  Neuro:  GCS       Fluids:    Intake/Output Summary (Last 24 hours) at 17 0843  Last data filed at 17 0500   Gross per 24 hour   Intake   2575 ml   Output      0 ml   Net   2575 ml        Current Diet Order   Procedures   • DIET NPO     Physical Exam   Constitutional: She is oriented to person, place, and time. She appears well-developed. No distress.   HENT:   Head: Normocephalic.   Eyes: Pupils are equal, round, and reactive to light.   Neck: Normal range of motion.   Cardiovascular: Normal rate and regular rhythm.    Pulmonary/Chest: Effort normal. No respiratory distress.   Abdominal: Soft. She exhibits no distension. There is tenderness.   Musculoskeletal: Normal range of motion. She exhibits no edema.   Neurological: She is alert and oriented to person, place, and time.   Skin: Skin is warm and dry.   Nursing note and vitals reviewed.    Labs:  Recent Results (from the past 24 hour(s))   COMP METABOLIC PANEL    Collection Time: 17  2:13 AM   Result Value Ref  Range    Sodium 135 135 - 145 mmol/L    Potassium 3.3 (L) 3.6 - 5.5 mmol/L    Chloride 106 96 - 112 mmol/L    Co2 21 20 - 33 mmol/L    Anion Gap 8.0 0.0 - 11.9    Glucose 66 65 - 99 mg/dL    Bun 16 8 - 22 mg/dL    Creatinine 0.89 0.50 - 1.40 mg/dL    Calcium 8.1 (L) 8.5 - 10.5 mg/dL    AST(SGOT) 129 (H) 12 - 45 U/L    ALT(SGPT) 82 (H) 2 - 50 U/L    Alkaline Phosphatase 175 (H) 30 - 99 U/L    Total Bilirubin 3.9 (H) 0.1 - 1.5 mg/dL    Albumin 3.1 (L) 3.2 - 4.9 g/dL    Total Protein 5.3 (L) 6.0 - 8.2 g/dL    Globulin 2.2 1.9 - 3.5 g/dL    A-G Ratio 1.4 g/dL   ESTIMATED GFR    Collection Time: 04/07/17  2:13 AM   Result Value Ref Range    GFR If African American >60 >60 mL/min/1.73 m 2    GFR If Non African American >60 >60 mL/min/1.73 m 2   CBC WITH DIFFERENTIAL    Collection Time: 04/07/17  2:14 AM   Result Value Ref Range    WBC 4.8 4.8 - 10.8 K/uL    RBC 3.85 (L) 4.20 - 5.40 M/uL    Hemoglobin 12.0 12.0 - 16.0 g/dL    Hematocrit 37.1 37.0 - 47.0 %    MCV 96.4 81.4 - 97.8 fL    MCH 31.2 27.0 - 33.0 pg    MCHC 32.3 (L) 33.6 - 35.0 g/dL    RDW 46.5 35.9 - 50.0 fL    Platelet Count 148 (L) 164 - 446 K/uL    MPV 10.5 9.0 - 12.9 fL    Neutrophils-Polys 73.60 (H) 44.00 - 72.00 %    Lymphocytes 12.50 (L) 22.00 - 41.00 %    Monocytes 9.60 0.00 - 13.40 %    Eosinophils 2.90 0.00 - 6.90 %    Basophils 0.80 0.00 - 1.80 %    Immature Granulocytes 0.60 0.00 - 0.90 %    Nucleated RBC 0.00 /100 WBC    Neutrophils (Absolute) 3.54 2.00 - 7.15 K/uL    Lymphs (Absolute) 0.60 (L) 1.00 - 4.80 K/uL    Monos (Absolute) 0.46 0.00 - 0.85 K/uL    Eos (Absolute) 0.14 0.00 - 0.51 K/uL    Baso (Absolute) 0.04 0.00 - 0.12 K/uL    Immature Granulocytes (abs) 0.03 0.00 - 0.11 K/uL    NRBC (Absolute) 0.00 K/uL     Medical Decision Making, by Problem:  Active Hospital Problems    Diagnosis   • Acute cholecystitis [K81.0]     4/6 - MRCP shows choledocholithiasis  Plan ERCP and cholecystectomy to follow       Plan:  HD #1 admitted with  choledocholithiasis    MRCP yesterday +CBD stones.  GI will do ERCP tomorrow.  Plan cholecystectomy Sunday am.  Clear liquids until midnight 4/8.  Mobilize patient.    Quality Measures:  Labs reviewed, Medications reviewed and Radiology images reviewed  Livingston catheter: No Livingston      DVT Prophylaxis: Enoxaparin (Lovenox)  DVT prophylaxis - mechanical: SCDs  Ulcer prophylaxis: Yes  Antibiotics: Treating active infection/contamination beyond 24 hours perioperative coverage  Assessed for rehab: Patient was assess for and/or received rehabilitation services during this hospitalization      Discussed patient condition with RN, Patient and Dr. Lynn

## 2017-04-07 NOTE — CARE PLAN
Problem: Communication  Goal: The ability to communicate needs accurately and effectively will improve  Outcome: PROGRESSING AS EXPECTED  Pt communicates needs appropriately to nursing staff. Pt calls appropriately.     Problem: Safety  Goal: Will remain free from injury  Outcome: PROGRESSING AS EXPECTED  Pt remained safe and free from falls this shift. Room clear of any obstacles. Bed in lowest position. Call light within reach. Pt reminded to call for assistance before attempting to get out of bed. Fall precautions in place.

## 2017-04-07 NOTE — DISCHARGE PLANNING
Care Transition Team Assessment    IHD met with patient bedside. She lives with a roommate and is independent at home. Drives herself to appointments. No major needs or concerns identified.   Information Source  Orientation : Oriented x 4  Information Given By: Patient  Informant's Name: Ning  Who is responsible for making decisions for patient? : Patient    Readmission Evaluation  Is this a readmission?: No    Elopement Risk  Legal Hold: No  Ambulatory or Self Mobile in Wheelchair: Yes  Disoriented: No  Psychiatric Symptoms: None  History of Wandering: No  Elopement this Admit: No  Vocalizing Wanting to Leave: No  Displays Behaviors, Body Language Wanting to Leave: No-Not at Risk for Elopement  Elopement Risk: Not at Risk for Elopement    Interdisciplinary Discharge Planning  Does Admitting Nurse Feel This Could be a Complex Discharge?: No  Primary Care Physician: Dr. Karen Alcazar  Lives with - Patient's Self Care Capacity: Alone and Able to Care For Self, Unrelated Adult  Patient or legal guardian wants to designate a caregiver (see row info): No  Support Systems: Other (Comments), Children (Roommate)  Housing / Facility: [address]  Do You Take your Prescribed Medications Regularly: Yes  Able to Return to Previous ADL's: Yes  Mobility Issues: No  Prior Services: None  Patient Expects to be Discharged to:: Home  Assistance Needed: No  Durable Medical Equipment: Not Applicable    Discharge Preparedness  What is your plan after discharge?: Home with help  What are your discharge supports?: Other (comment) (roommate)  Prior Functional Level: Ambulatory, Drives Self, Independent with Activities of Daily Living, Independent with Medication Management  Difficulity with ADLs: None  Difficulity with IADLs: None    Functional Assesment  Prior Functional Level: Ambulatory, Drives Self, Independent with Activities of Daily Living, Independent with Medication Management    Finances  Financial Barriers to Discharge:  No  Prescription Coverage: Yes ([employer])    Vision / Hearing Impairment  Vision Impairment : No  Hearing Impairment : No    Values / Beliefs / Concerns  Values / Beliefs Concerns : No         Domestic Abuse  Have you ever been the victim of abuse or violence?: No  Physical Abuse or Sexual Abuse: No  Verbal Abuse or Emotional Abuse: No  Possible Abuse Reported to:: Not Applicable    Psychological Assessment  History of Substance Abuse: None  History of Psychiatric Problems: No  Non-compliant with Treatment: No  Newly Diagnosed Illness: No    Discharge Risks or Barriers  Discharge risks or barriers?: No    Anticipated Discharge Information  Anticipated discharge disposition: Discharge needs currently unknown  Discharge Address: [address] [place] [id number]  Discharge Contact Phone Number: [phone]

## 2017-04-07 NOTE — PROGRESS NOTES
Med rec updated and complete per pt and family at bedside.  Pt has RX for Valium 2mg every 12 hours as needed but has not started taking it yet.   Allergies reviewed.  No oral ABX within last 30 days.

## 2017-04-07 NOTE — CONSULTS
Gastroenterology Consult Note:    Lola Riddle  Date & Time note created:    4/6/2017   6:31 PM     Patient ID:  Name:             Ning Inman    YOB: 1950  Age:                 66 y.o.  female  MRN:               [id number]    Referring MD:  Dr. Lynn                                                             Chief Complaint(s):      Choledocholithiasis for ERCP    History of Present Illness:    Ning Inman is a 66 y.o. female who presented to [facility] ED after transfer from [facility] for concern for acute cholecystitis. Patient reports ruq sharp pain x 4-5 days associated with nausea/vomiting. The pain was persistent, not related to meals. Normal bowel movements. No fevers/chills. Feels better after IV meds. Given Ceftriaxone prior to transfer. Denies blood thinner medications. Now on Zosyn.     OSH CT: Distended GB with pericholecystic fluid and thickened gallbladder wall and CBD dilated to 10 mm c/w acute cholecystitis.    OSH labs: WBC 8.16; Creatinine 1.54; Total Bili 10.9; Alk erica 242; ; ; Lipase 114    Otherwise the patient is doing fine without complaints of fever/chills/weight loss/appetite change/dysphagia/odynophagia/heartburn/bloating/constipation/diarrhea/melena/hematochezia.    Review of Systems:      Constitutional: Denies fevers, weight changes  Eyes: Denies changes in vision, POS jaundice  Ears/Nose/Throat/Mouth: Denies nasal congestion or sore throat   Cardiovascular: Denies chest pain, Denies palpitations   Respiratory: Denies shortness of breath, denies cough  Gastrointestinal/Hepatic: RUQ abdominal pain, nausea, vomiting, denies diarrhea, constipation or GI bleeding   Genitourinary: Denies dysuria or frequency  Musculoskeletal/Rheum: Denies  joint pain and swelling, edema  Skin: Denies rash  Neurological: Denies headache, confusion, memory loss or focal weakness/parasthesias  Psychiatric: denies mood disorder   Endocrine: Freda thyroid  problems  Heme/Oncology/Lymph Nodes: Denies enlarged lymph nodes, denies brusing or known bleeding disorder  All other systems were reviewed and are negative (AMA/CMS criteria)              Past Medical History:   Past Medical History   Diagnosis Date   • Seizure disorder (CMS-HCC)    • Hypertension      Active Hospital Problems    Diagnosis   • Acute cholecystitis [K81.0]       Past Surgical History:  History reviewed. No pertinent past surgical history.    Hospital Medications:  Current Facility-Administered Medications   Medication Dose Frequency Provider Last Rate Last Dose   • NS infusion   Continuous Jae Ibrahim M.D. 125 mL/hr at 04/06/17 1740     • HYDROmorphone (DILAUDID) injection 1 mg  1 mg Q3HRS PRN Jae Ibrahim M.D.   1 mg at 04/06/17 1225   • ondansetron (ZOFRAN) syringe/vial injection 4 mg  4 mg Q6HRS PRN Jae Ibrahim M.D.       • piperacillin-tazobactam (ZOSYN) 3.375 g in  mL IVPB  3.375 g Q6HRS Indu Lynn M.D. 200 mL/hr at 04/06/17 1738 3.375 g at 04/06/17 1738   • loratadine (CLARITIN) tablet 10 mg  10 mg DAILY Indu Lynn M.D.   10 mg at 04/06/17 1026   • phenytoin (DILANTIN) chewable tab 100 mg  100 mg QHS Indu Lynn M.D.       • diazepam (VALIUM) tablet 2 mg  2 mg BID PRN Indu Lynn M.D.       • atenolol (TENORMIN) tablet 50 mg  50 mg Q DAY Indu Lynn M.D.   50 mg at 04/06/17 1026   • amlodipine (NORVASC) tablet 10 mg  10 mg Nightly Indu Lynn M.D.       • predniSONE (DELTASONE) tablet 10 mg  10 mg QDAY PRN Indu Lynn M.D.         Last reviewed on 4/6/2017  5:35 PM by Kaylee Zhou, Pharmacy Int    Current Outpatient Medications:  Prescriptions prior to admission   Medication Sig Dispense Refill Last Dose   • amlodipine (NORVASC) 10 MG Tab Take 10 mg by mouth every day.   4/6/2017 at am   • phenytoin ER (DILANTIN) 100 MG Cap Take 100 mg by mouth every bedtime.   4/5/2017 at pm   • predniSONE (DELTASONE) 10 MG Tab Take 10 mg by mouth as needed.    "4/2/2017 at unk   • atenolol (TENORMIN) 50 MG Tab Take 50 mg by mouth every day.   4/5/2017 at pm   • loratadine (CLARITIN) 10 MG Tab Take 10 mg by mouth every day.   4/6/2017 at am       Medication Allergy:  Allergies   Allergen Reactions   • Hyzaar [Losartan Potassium-Hctz]      Dizziness         Family History:  History reviewed. No pertinent family history.    Social History:  Social History     Social History   • Marital Status:      Spouse Name: N/A   • Number of Children: N/A   • Years of Education: N/A     Occupational History   • Not on file.     Social History Main Topics   • Smoking status: Current Every Day Smoker -- 1.00 packs/day     Types: Cigarettes   • Smokeless tobacco: Not on file   • Alcohol Use: No   • Drug Use: No   • Sexual Activity: Not on file     Other Topics Concern   • Not on file     Social History Narrative   • No narrative on file       Physical Exam:  Weight/BMI: Body mass index is 25.42 kg/(m^2).  Blood pressure 108/58, pulse 62, temperature 36.4 °C (97.6 °F), resp. rate 18, height 1.651 m (5' 5\"), weight 69.3 kg (152 lb 12.5 oz), SpO2 91 %, not currently breastfeeding.  Filed Vitals:    04/06/17 0441 04/06/17 0804 04/06/17 1139 04/06/17 1600   BP: 112/67 121/68 120/70 108/58   Pulse: 74 66 63 62   Temp: 36.4 °C (97.6 °F) 36.3 °C (97.4 °F) 36.7 °C (98 °F) 36.4 °C (97.6 °F)   Resp:  18 18 18   Height: 1.651 m (5' 5\")      Weight: 69.3 kg (152 lb 12.5 oz)      SpO2:  96% 94% 91%     Oxygen Therapy:  Pulse Oximetry: 91 %, O2 Delivery: None (Room Air)    Intake/Output Summary (Last 24 hours) at 04/06/17 1831  Last data filed at 04/06/17 1600   Gross per 24 hour   Intake   1000 ml   Output      0 ml   Net   1000 ml       Constitutional:   Well developed, well nourished, no acute distress  HEENT:  Normocephalic, Atraumatic, Conjunctiva not pale, Sclera icteric, Oropharynx moist mucous membranes, No oral exudates, Nose normal.  No thyromegaly.  Neck:  Normal range of motion, No " cervical tenderness,  no JVD.  Chest/Lungs:  Symmetric expansion, no spider angioma, breath sounds clear to auscultation bilaterally,  no crackles, no wheezing.   Cardiovascular:  Normal heart rate, Normal rhythm, No murmurs, No rubs, No gallops.    Abdomen: Bowel sounds normal, Soft, minimal RUQ tenderness, No guarding, No rebound, No masses, No hepatosplenomegaly.  Extremities: No cyanosis/clubbing/edema/palmar erythema/flapping tremor  Skin: Warm, Dry, No erythema, No rash, no induration.    MDM (Data Review):     Records reviewed and summarized in current documentation    Lab Data Review:  Recent Results (from the past 24 hour(s))   CBC WITH DIFFERENTIAL    Collection Time: 04/06/17  1:40 AM   Result Value Ref Range    WBC 7.3 4.8 - 10.8 K/uL    RBC 4.44 4.20 - 5.40 M/uL    Hemoglobin 14.0 12.0 - 16.0 g/dL    Hematocrit 41.9 37.0 - 47.0 %    MCV 94.4 81.4 - 97.8 fL    MCH 31.5 27.0 - 33.0 pg    MCHC 33.4 (L) 33.6 - 35.0 g/dL    RDW 44.9 35.9 - 50.0 fL    Platelet Count 180 164 - 446 K/uL    MPV 9.7 9.0 - 12.9 fL    Neutrophils-Polys 79.80 (H) 44.00 - 72.00 %    Lymphocytes 8.60 (L) 22.00 - 41.00 %    Monocytes 9.50 0.00 - 13.40 %    Eosinophils 0.70 0.00 - 6.90 %    Basophils 0.30 0.00 - 1.80 %    Immature Granulocytes 1.10 (H) 0.00 - 0.90 %    Nucleated RBC 0.00 /100 WBC    Neutrophils (Absolute) 5.82 2.00 - 7.15 K/uL    Lymphs (Absolute) 0.63 (L) 1.00 - 4.80 K/uL    Monos (Absolute) 0.69 0.00 - 0.85 K/uL    Eos (Absolute) 0.05 0.00 - 0.51 K/uL    Baso (Absolute) 0.02 0.00 - 0.12 K/uL    Immature Granulocytes (abs) 0.08 0.00 - 0.11 K/uL    NRBC (Absolute) 0.00 K/uL   CMP    Collection Time: 04/06/17  1:40 AM   Result Value Ref Range    Sodium 130 (L) 135 - 145 mmol/L    Potassium 3.8 3.6 - 5.5 mmol/L    Chloride 99 96 - 112 mmol/L    Co2 22 20 - 33 mmol/L    Anion Gap 9.0 0.0 - 11.9    Glucose 78 65 - 99 mg/dL    Bun 25 (H) 8 - 22 mg/dL    Creatinine 1.21 0.50 - 1.40 mg/dL    Calcium 8.9 8.5 - 10.5 mg/dL     AST(SGOT) 152 (H) 12 - 45 U/L    ALT(SGPT) 124 (H) 2 - 50 U/L    Alkaline Phosphatase 182 (H) 30 - 99 U/L    Total Bilirubin 9.4 (H) 0.1 - 1.5 mg/dL    Albumin 3.5 3.2 - 4.9 g/dL    Total Protein 6.3 6.0 - 8.2 g/dL    Globulin 2.8 1.9 - 3.5 g/dL    A-G Ratio 1.3 g/dL   PROTHROMBIN TIME    Collection Time: 04/06/17  1:40 AM   Result Value Ref Range    PT 12.9 12.0 - 14.6 sec    INR 0.94 0.87 - 1.13   PTT    Collection Time: 04/06/17  1:40 AM   Result Value Ref Range    APTT 26.5 24.7 - 36.0 sec   COD (ADULT)    Collection Time: 04/06/17  1:40 AM   Result Value Ref Range    ABO Grouping Only O     Rh Grouping Only POS     Antibody Screen-Cod NEG    ESTIMATED GFR    Collection Time: 04/06/17  1:40 AM   Result Value Ref Range    GFR If  54 (A) >60 mL/min/1.73 m 2    GFR If Non  44 (A) >60 mL/min/1.73 m 2   ABO CONFIRMATION    Collection Time: 04/06/17  2:37 AM   Result Value Ref Range    Second ABO Typing With Cod O          Imaging/Procedures Review:    4/6/17 MRI:  1. Obstructing choledocholithiasis with dilated CBD.  2. Cholelithiasis with findings in keeping with acute cholecystitis.    MDM (Assessment and Plan):     Active Hospital Problems    Diagnosis   • Acute cholecystitis [K81.0]       Assessment  1. Choledocholithiasis  2. Obstructive jaundice 2/2/ #1  3. Cholecystitis  4. Seizure disorder  5. Hypertension  6. Smoker    Risks, benefits, and alternatives were discussed with patient. Consenting persons were given an opportunity to ask questions and discuss other options. Risks including but not limited to failed or incomplete endoscopy, ineffective therapy, perforation, infection, bleeding, missed lesion(s), cardiac and/or pulmonary event, aspiration, stroke, possible need for surgery, hospitalization possibly prolonged, discomfort, unsuccessful and/or incomplete procedure, possible need for repeat procedures and/or additional testings, damage to adjacent organs and/or vascular  structures, medication reaction, disability, death, and other adverse events possibly life-threatening. Discussion was undertaken with Layman's terms. Consenting persons stated understanding and acceptance of these risks. All questions were answered.    Plan  1. Clear liq diet  2. NPO after midnight on 4/7/17  3. Will try to schedule ERCP ASAP  4. Pain control per primary team    Thank you very much for allowing me to participate in the care of your patient.  Please feel free to contact me anytime at [phone].     Lola Riddle M.D.

## 2017-04-07 NOTE — CARE PLAN
Problem: Safety  Goal: Will remain free from falls  Outcome: PROGRESSING AS EXPECTED  Pt educated regarding fall risk and intervention. Pt verbalized understanding and still refusing bed alarm. Pt A&O x4. Pt demonstrates appropriate use of call light to call for assistance. Hourly rounding in place.    Problem: Venous Thromboembolism (VTW)/Deep Vein Thrombosis (DVT) Prevention:  Goal: Patient will participate in Venous Thrombosis (VTE)/Deep Vein Thrombosis (DVT)Prevention Measures  Outcome: PROGRESSING AS EXPECTED  SCDs in place.

## 2017-04-08 ENCOUNTER — APPOINTMENT (OUTPATIENT)
Dept: RADIOLOGY | Facility: MEDICAL CENTER | Age: 67
DRG: 419 | End: 2017-04-08
Attending: INTERNAL MEDICINE
Payer: MEDICARE

## 2017-04-08 LAB — EKG IMPRESSION: NORMAL

## 2017-04-08 PROCEDURE — A4606 OXYGEN PROBE USED W OXIMETER: HCPCS | Performed by: INTERNAL MEDICINE

## 2017-04-08 PROCEDURE — 502240 HCHG MISC OR SUPPLY RC 0272: Performed by: INTERNAL MEDICINE

## 2017-04-08 PROCEDURE — 700111 HCHG RX REV CODE 636 W/ 250 OVERRIDE (IP): Performed by: EMERGENCY MEDICINE

## 2017-04-08 PROCEDURE — 160002 HCHG RECOVERY MINUTES (STAT): Performed by: INTERNAL MEDICINE

## 2017-04-08 PROCEDURE — 93010 ELECTROCARDIOGRAM REPORT: CPT | Performed by: INTERNAL MEDICINE

## 2017-04-08 PROCEDURE — 160036 HCHG PACU - EA ADDL 30 MINS PHASE I: Performed by: INTERNAL MEDICINE

## 2017-04-08 PROCEDURE — 700111 HCHG RX REV CODE 636 W/ 250 OVERRIDE (IP)

## 2017-04-08 PROCEDURE — 0FC98ZZ EXTIRPATION OF MATTER FROM COMMON BILE DUCT, VIA NATURAL OR ARTIFICIAL OPENING ENDOSCOPIC: ICD-10-PCS | Performed by: INTERNAL MEDICINE

## 2017-04-08 PROCEDURE — 160035 HCHG PACU - 1ST 60 MINS PHASE I: Performed by: INTERNAL MEDICINE

## 2017-04-08 PROCEDURE — 160048 HCHG OR STATISTICAL LEVEL 1-5: Performed by: INTERNAL MEDICINE

## 2017-04-08 PROCEDURE — 110382 HCHG SHELL REV 271: Performed by: INTERNAL MEDICINE

## 2017-04-08 PROCEDURE — 500066 HCHG BITE BLOCK, ECT: Performed by: INTERNAL MEDICINE

## 2017-04-08 PROCEDURE — 700111 HCHG RX REV CODE 636 W/ 250 OVERRIDE (IP): Performed by: SURGERY

## 2017-04-08 PROCEDURE — 93005 ELECTROCARDIOGRAM TRACING: CPT | Performed by: ANESTHESIOLOGY

## 2017-04-08 PROCEDURE — 700105 HCHG RX REV CODE 258: Performed by: EMERGENCY MEDICINE

## 2017-04-08 PROCEDURE — BF131ZZ FLUOROSCOPY OF GALLBLADDER AND BILE DUCTS USING LOW OSMOLAR CONTRAST: ICD-10-PCS | Performed by: INTERNAL MEDICINE

## 2017-04-08 PROCEDURE — 160009 HCHG ANES TIME/MIN: Performed by: INTERNAL MEDICINE

## 2017-04-08 PROCEDURE — 700102 HCHG RX REV CODE 250 W/ 637 OVERRIDE(OP): Performed by: SURGERY

## 2017-04-08 PROCEDURE — 160203 HCHG ENDO MINUTES - 1ST 30 MINS LEVEL 4: Performed by: INTERNAL MEDICINE

## 2017-04-08 PROCEDURE — 770006 HCHG ROOM/CARE - MED/SURG/GYN SEMI*

## 2017-04-08 PROCEDURE — 110371 HCHG SHELL REV 272: Performed by: INTERNAL MEDICINE

## 2017-04-08 PROCEDURE — A9270 NON-COVERED ITEM OR SERVICE: HCPCS | Performed by: SURGERY

## 2017-04-08 PROCEDURE — 700105 HCHG RX REV CODE 258: Performed by: SURGERY

## 2017-04-08 PROCEDURE — 160208 HCHG ENDO MINUTES - EA ADDL 1 MIN LEVEL 4: Performed by: INTERNAL MEDICINE

## 2017-04-08 RX ADMIN — PIPERACILLIN AND TAZOBACTAM 3.38 G: 3; .375 INJECTION, POWDER, LYOPHILIZED, FOR SOLUTION INTRAVENOUS; PARENTERAL at 11:37

## 2017-04-08 RX ADMIN — SODIUM CHLORIDE: 9 INJECTION, SOLUTION INTRAVENOUS at 15:08

## 2017-04-08 RX ADMIN — PIPERACILLIN AND TAZOBACTAM 3.38 G: 3; .375 INJECTION, POWDER, LYOPHILIZED, FOR SOLUTION INTRAVENOUS; PARENTERAL at 18:18

## 2017-04-08 RX ADMIN — PIPERACILLIN AND TAZOBACTAM 3.38 G: 3; .375 INJECTION, POWDER, LYOPHILIZED, FOR SOLUTION INTRAVENOUS; PARENTERAL at 04:57

## 2017-04-08 RX ADMIN — HYDROMORPHONE HYDROCHLORIDE 1 MG: 1 INJECTION, SOLUTION INTRAMUSCULAR; INTRAVENOUS; SUBCUTANEOUS at 08:43

## 2017-04-08 RX ADMIN — SODIUM CHLORIDE: 9 INJECTION, SOLUTION INTRAVENOUS at 05:08

## 2017-04-08 RX ADMIN — AMLODIPINE BESYLATE 10 MG: 10 TABLET ORAL at 21:06

## 2017-04-08 RX ADMIN — HYDROMORPHONE HYDROCHLORIDE 1 MG: 1 INJECTION, SOLUTION INTRAMUSCULAR; INTRAVENOUS; SUBCUTANEOUS at 18:18

## 2017-04-08 RX ADMIN — PIPERACILLIN AND TAZOBACTAM 3.38 G: 3; .375 INJECTION, POWDER, LYOPHILIZED, FOR SOLUTION INTRAVENOUS; PARENTERAL at 23:20

## 2017-04-08 RX ADMIN — HYDROMORPHONE HYDROCHLORIDE 1 MG: 1 INJECTION, SOLUTION INTRAMUSCULAR; INTRAVENOUS; SUBCUTANEOUS at 15:15

## 2017-04-08 RX ADMIN — PHENYTOIN 100 MG: 50 TABLET, CHEWABLE ORAL at 22:13

## 2017-04-08 RX ADMIN — HYDROMORPHONE HYDROCHLORIDE 1 MG: 1 INJECTION, SOLUTION INTRAMUSCULAR; INTRAVENOUS; SUBCUTANEOUS at 05:06

## 2017-04-08 RX ADMIN — HYDROMORPHONE HYDROCHLORIDE 1 MG: 1 INJECTION, SOLUTION INTRAMUSCULAR; INTRAVENOUS; SUBCUTANEOUS at 02:12

## 2017-04-08 RX ADMIN — LORATADINE 10 MG: 10 TABLET ORAL at 08:45

## 2017-04-08 RX ADMIN — SODIUM CHLORIDE: 9 INJECTION, SOLUTION INTRAVENOUS at 23:21

## 2017-04-08 RX ADMIN — HYDROMORPHONE HYDROCHLORIDE 1 MG: 1 INJECTION, SOLUTION INTRAMUSCULAR; INTRAVENOUS; SUBCUTANEOUS at 22:14

## 2017-04-08 RX ADMIN — PIPERACILLIN AND TAZOBACTAM 3.38 G: 3; .375 INJECTION, POWDER, LYOPHILIZED, FOR SOLUTION INTRAVENOUS; PARENTERAL at 00:14

## 2017-04-08 RX ADMIN — ATENOLOL 50 MG: 50 TABLET ORAL at 08:43

## 2017-04-08 ASSESSMENT — PAIN SCALES - GENERAL
PAINLEVEL_OUTOF10: 0
PAINLEVEL_OUTOF10: 0
PAINLEVEL_OUTOF10: 7
PAINLEVEL_OUTOF10: 0
PAINLEVEL_OUTOF10: 3
PAINLEVEL_OUTOF10: 4
PAINLEVEL_OUTOF10: 0
PAINLEVEL_OUTOF10: 5
PAINLEVEL_OUTOF10: 4
PAINLEVEL_OUTOF10: 5
PAINLEVEL_OUTOF10: 0
PAINLEVEL_OUTOF10: 7

## 2017-04-08 ASSESSMENT — ENCOUNTER SYMPTOMS: ABDOMINAL PAIN: 1

## 2017-04-08 NOTE — PROCEDURES
DATE OF SERVICE:  04/08/2017    PROCEDURES PERFORMED:  Endoscopic retrograde cholangiopancreatography with:  1.  Biliary sphincterotomy.  2.  Balloon stone extraction.  3.  Radiologic interpretation of static and dynamic fluoroscopic images by   myself, at no time was a radiologist present in the room.    INDICATIONS:  Choledocholithiasis, obstructive jaundice.    FINAL IMPRESSION:  1.  Biliary ampulla unremarkable.  2.  Pancreatic duct neither injected nor cannulated.  3.  Common bile duct mildly ectatic course.  Dilated extrahepatic course.    Unremarkable intrahepatic tree.  Two filling defects consistent with stones.    THERAPEUTICS:  1.  A 10 mm biliary sphincterotomy with bow papillotome over covered wire.  2.  Balloon extraction of an 8 mm black faceted stones x2.  Further sweeps   negative.    RECOMMENDATIONS:  1.  Follow liver enzymes.  2.  Clear diet, advance to low fat diet as tolerated.  3.  Proceed with plans for cholecystectomy.    DESCRIPTION OF PROCEDURE:  Prior to the procedure, physical exam was stable.    During procedure, vital signs remained within normal limits.  Prior to   sedation, informed consent was obtained.  Risks, benefits, alternatives   including but not limited to risk of bleeding, infection, perforation, adverse   reaction to medication, failure to identify pathology, pancreatitis and death   explained at length with the patient.  She accepted all risks.  The patient   was prepped in the prone position after intubation and sedation by anesthesia.    Scope tip of the Olympus flexible side viewing TJF duodenoscope passed to   level of the ampulla in the short position.  The biliary ampulla was   unremarkable.  [place] papillotome with a covered wire was utilized for selective   cannulation of the common bile duct.  This was achieved on the very first   attempt.  At no time was the pancreatic duct injected, cannulated or otherwise   manipulated.  Wire passed along the expected course of  the bile duct and   followed with cannula.  Injection of contrast was made filling the distal   duct.  Two small filling defects measuring 8 mm each were observed.  The   common bile duct and common hepatic duct were dilated throughout their course   with a slightly ectatic course.  The intrahepatic biliary tree was   unremarkable.  The cystic duct did not fill.  The wire was left in place and a   10 mm biliary sphincterotomy was performed with bow papillotome over the   covered wire.  The sphincterotome was removed and the 12-15 mm balloon   catheter was exchanged over the wire and inflated just proximal to the most   distal stone.  This was withdrawn down the duct with delivery of the stone and   the 12 mm balloon pulled through the sphincterotomy site with minimal effort.    The balloon was deflated, passed proximal to the next stone and the stone   was extracted in a similar fashion.  Repeat sweeps were made and were   negative, and sweep with a 15 mm balloon from the common hepatic duct was made   with no further stones or debris.  The balloon pulled with mild-to-moderate   effort through the sphincterotomy.  Procedure was deemed complete.  The scope   was withdrawn.  Air and liquid were suctioned.  The patient tolerated the   procedure well and was sent to recovery without immediate complications.       ____________________________________     MD CAMILLE BEAN / HARRIET    DD:  04/08/2017 13:12:45  DT:  04/08/2017 13:27:34    D#:  [id number]  Job#:  [id number]

## 2017-04-08 NOTE — PROGRESS NOTES
Patient seen and examined.  Chart reviewed.  ERCP along with risks explained at length.  Will proceed as planned.

## 2017-04-08 NOTE — PROGRESS NOTES
"  Trauma/Surgical Progress Note    Author: Indu Lynn Date & Time created: 4/8/2017   10:34 AM     Interval Events:  Doing ok. ERCP today for choledocholithiasis.  Plan lap joey in AM.     Review of Systems   Gastrointestinal: Positive for abdominal pain.     Hemodynamics:  Blood pressure 110/59, pulse 60, temperature 36.6 °C (97.9 °F), resp. rate 15, height 1.651 m (5' 5\"), weight 69.3 kg (152 lb 12.5 oz), SpO2 97 %, not currently breastfeeding.     Respiratory:    Respiration: 15, Pulse Oximetry: 97 %        RUL Breath Sounds: Clear, RML Breath Sounds: Clear, RLL Breath Sounds: Diminished, SHIRIN Breath Sounds: Clear, LLL Breath Sounds: Diminished  Fluids:    Intake/Output Summary (Last 24 hours) at 04/08/17 1034  Last data filed at 04/08/17 0500   Gross per 24 hour   Intake 3327.5 ml   Output      0 ml   Net 3327.5 ml     Admit Weight: 66.225 kg (146 lb)  Current      Physical Exam   Constitutional: She appears well-developed and well-nourished.   HENT:   Head: Normocephalic.   Eyes: Scleral icterus is present.   Neck: Neck supple.   Cardiovascular: Normal rate.    Pulmonary/Chest: Effort normal.   Abdominal: Soft. She exhibits no distension. There is tenderness.   Musculoskeletal: Normal range of motion.   Neurological: She is alert.   Skin: Skin is warm.       Medical Decision Making/Problem List:    Active Hospital Problems    Diagnosis   • Acute cholecystitis [K81.0]     Priority: High     4/6 - MRCP shows choledocholithiasis  4/8 -  ERCP   Plan lap cholecystectomy 4/9       Core Measures & Quality Metrics:  Labs reviewed and Medications reviewed  Livingston catheter: No Livingston        DVT prophylaxis - mechanical: SCDs  Ulcer prophylaxis: Yes  Antibiotics: Treating active infection/contamination beyond 24 hours perioperative coverage      HEIDI Score  Discussed patient condition with RN and Patient.  CRITICAL CARE TIME EXCLUDING PROCEDURES: 20    minutes      "

## 2017-04-08 NOTE — PROGRESS NOTES
Patient transported via bed to ERCP. VSS, has remained NPO. Voided in BR prior to transfer. IV zosyn infusing and sent with patient.

## 2017-04-08 NOTE — H&P
IDENTIFICATION:  A 66-year-old female.    HISTORY OF PRESENT ILLNESS:  Patient presented to the emergency room with   acute onset of abdominal pain.  She initially presented in [place].  She had   an ultrasound, which demonstrated to have cholelithiasis and evidence of   choledocholithiasis.  She was transferred to [facility] for further   evaluation.    PAST MEDICAL HISTORY:  History of seizure disorder and hypertension.    PAST SURGICAL HISTORY:  None.    MEDICATIONS:  Currently are Norvasc, Tenormin, Dilantin and Claritin.    ALLERGIES:  HYZAAR.    SOCIAL HISTORY:  She lives in [place].  She smokes every day.  She does not   drink.    REVIEW OF SYSTEMS:  Otherwise unremarkable.    PHYSICAL EXAMINATION:  VITAL SIGNS:  She is afebrile.  HEENT:  She is icteric.  NECK:  Supple.  LUNGS:  Clear to auscultation.  HEART:  No murmur.  ABDOMEN:  Soft with minimal tenderness in the epigastrium.  She does not have   a Antunez sign.  EXTREMITIES:  Without edema.  NEUROLOGIC:  Intact.    LABORATORY DATA:  White count is 7000, her hemoglobin is 14.  Her electrolytes   are normal except for sodium of 130.  Liver function tests were markedly   elevated with transaminase in the 100 range, alkaline phosphatase 182 and   bilirubin is 9.  CT scan confirms cholelithiasis as well as   choledocholithiasis.  An MRCP has already been performed, which demonstrates a   common duct stone.    IMPRESSION:  A 66-year-old female with what appears to be cholelithiasis and   choledocholithiasis.    PLAN:  Plan will be admission, she will be placed on IV antibiotics.  She will   be seen by GI consultants in regards to proceeding with an ERCP and then we   will proceed with laparoscopic cholecystectomy.  Procedure was described to   the patient as well as the risks including bleeding, infection, conversion to   an open procedure, intraabdominal injuries, and anesthetic risks.  They   understand and wishes to proceed.        ____________________________________     MD ISIDRA MOE    DD:  04/08/2017 10:33:23  DT:  04/08/2017 11:01:58    D#:  [id number]  Job#:  [id number]

## 2017-04-08 NOTE — CARE PLAN
Problem: Safety  Goal: Will remain free from falls  Outcome: PROGRESSING AS EXPECTED  Pt calls for assistance appropriately. Pt ambulates with standby assist, steady gait noted.     Problem: Pain Management  Goal: Pain level will decrease to patient’s comfort goal  Outcome: PROGRESSING AS EXPECTED  C/o pain 5/10. Medicated per MAR with positive results.

## 2017-04-08 NOTE — OR SURGEON
Immediate Post-Operative Note      PreOp Diagnosis: Choledocolithiasis, obstructive jaundice    PostOp Diagnosis: Same    Procedure(s):  ERCP IN OR    Surgeon(s):  Calderon Singer M.D.    Anesthesiologist/Type of Anesthesia:  Anesthesiologist: Guanaco Moya M.D./* No anesthesia type entered *    Surgical Staff:  * No surgical staff found *    Dr. Singer  [facility]  JANE Collier  [phone]    ERCP with: Biliary sphincterotomy    Balloon stone extraction    Radiologic interpretation of static and dynamic fluoroscopic images    Indication: Choledocolithiasis, obstructive jaundice    Sedation: GA (Markosian)    Findings:   ERCP    Ampulla     Unremarkable    Pancreatic duct     Neither injected nor cannulated    CBD     Mildly ectatic course     Dilated     2 stones     Therapeutics    10mm biliary sphincterotomy with bow papillatome over covered wire    Balloon extraction 8mm black faceted stone    Plan:  Follow liver enzymes    Clear diet, advance low fat as tolerated    Proceed with plans for cholecystectomy      4/8/2017 12:17 PM Calderon Singer

## 2017-04-08 NOTE — PROGRESS NOTES
Received report from day shift RN. Pt A&O x 4. C/o pain 4/10. Medicated per MAR. Pt denies nausea, vomiting, chest pain, shortness of breath at this time. Pt on RA. Pt will be on NPO after midnight for ERCP tomorrow at 1630. Pt tolerating diet. -BM, BS x 4 normoactive. +void. Plan of care discussed with pt. All needs are met at this time. Call light within reach. Bed locked and in lowest position.

## 2017-04-09 LAB
ALBUMIN SERPL BCP-MCNC: 3 G/DL (ref 3.2–4.9)
ALBUMIN/GLOB SERPL: 1.4 G/DL
ALP SERPL-CCNC: 224 U/L (ref 30–99)
ALT SERPL-CCNC: 76 U/L (ref 2–50)
ANION GAP SERPL CALC-SCNC: 6 MMOL/L (ref 0–11.9)
AST SERPL-CCNC: 127 U/L (ref 12–45)
BILIRUB SERPL-MCNC: 2.2 MG/DL (ref 0.1–1.5)
BUN SERPL-MCNC: 3 MG/DL (ref 8–22)
CALCIUM SERPL-MCNC: 8 MG/DL (ref 8.5–10.5)
CHLORIDE SERPL-SCNC: 110 MMOL/L (ref 96–112)
CO2 SERPL-SCNC: 20 MMOL/L (ref 20–33)
CREAT SERPL-MCNC: 0.63 MG/DL (ref 0.5–1.4)
GFR SERPL CREATININE-BSD FRML MDRD: >60 ML/MIN/1.73 M 2
GLOBULIN SER CALC-MCNC: 2.2 G/DL (ref 1.9–3.5)
GLUCOSE SERPL-MCNC: 79 MG/DL (ref 65–99)
LIPASE SERPL-CCNC: 26 U/L (ref 11–82)
POTASSIUM SERPL-SCNC: 3.9 MMOL/L (ref 3.6–5.5)
PROT SERPL-MCNC: 5.2 G/DL (ref 6–8.2)
SODIUM SERPL-SCNC: 136 MMOL/L (ref 135–145)

## 2017-04-09 PROCEDURE — 83690 ASSAY OF LIPASE: CPT

## 2017-04-09 PROCEDURE — 160028 HCHG SURGERY MINUTES - 1ST 30 MINS LEVEL 3: Performed by: SURGERY

## 2017-04-09 PROCEDURE — 160048 HCHG OR STATISTICAL LEVEL 1-5: Performed by: SURGERY

## 2017-04-09 PROCEDURE — 700102 HCHG RX REV CODE 250 W/ 637 OVERRIDE(OP)

## 2017-04-09 PROCEDURE — 700105 HCHG RX REV CODE 258: Performed by: SURGERY

## 2017-04-09 PROCEDURE — 501574 HCHG TROCAR, SMTH CAN&SEAL 5: Performed by: SURGERY

## 2017-04-09 PROCEDURE — 700102 HCHG RX REV CODE 250 W/ 637 OVERRIDE(OP): Performed by: SURGERY

## 2017-04-09 PROCEDURE — 0FT44ZZ RESECTION OF GALLBLADDER, PERCUTANEOUS ENDOSCOPIC APPROACH: ICD-10-PCS | Performed by: SURGERY

## 2017-04-09 PROCEDURE — 80053 COMPREHEN METABOLIC PANEL: CPT

## 2017-04-09 PROCEDURE — 500868 HCHG NEEDLE, SURGI(VARES): Performed by: SURGERY

## 2017-04-09 PROCEDURE — 700101 HCHG RX REV CODE 250

## 2017-04-09 PROCEDURE — 36415 COLL VENOUS BLD VENIPUNCTURE: CPT

## 2017-04-09 PROCEDURE — A9270 NON-COVERED ITEM OR SERVICE: HCPCS | Performed by: NURSE PRACTITIONER

## 2017-04-09 PROCEDURE — 110382 HCHG SHELL REV 271: Performed by: SURGERY

## 2017-04-09 PROCEDURE — A9270 NON-COVERED ITEM OR SERVICE: HCPCS | Performed by: SURGERY

## 2017-04-09 PROCEDURE — 700111 HCHG RX REV CODE 636 W/ 250 OVERRIDE (IP): Performed by: SURGERY

## 2017-04-09 PROCEDURE — 160035 HCHG PACU - 1ST 60 MINS PHASE I: Performed by: SURGERY

## 2017-04-09 PROCEDURE — 160009 HCHG ANES TIME/MIN: Performed by: SURGERY

## 2017-04-09 PROCEDURE — 700111 HCHG RX REV CODE 636 W/ 250 OVERRIDE (IP)

## 2017-04-09 PROCEDURE — 502240 HCHG MISC OR SUPPLY RC 0272: Performed by: SURGERY

## 2017-04-09 PROCEDURE — 88304 TISSUE EXAM BY PATHOLOGIST: CPT

## 2017-04-09 PROCEDURE — 160002 HCHG RECOVERY MINUTES (STAT): Performed by: SURGERY

## 2017-04-09 PROCEDURE — A9270 NON-COVERED ITEM OR SERVICE: HCPCS

## 2017-04-09 PROCEDURE — 770006 HCHG ROOM/CARE - MED/SURG/GYN SEMI*

## 2017-04-09 PROCEDURE — 110371 HCHG SHELL REV 272: Performed by: SURGERY

## 2017-04-09 PROCEDURE — 501838 HCHG SUTURE GENERAL: Performed by: SURGERY

## 2017-04-09 PROCEDURE — 500514 HCHG ENDOCLIP: Performed by: SURGERY

## 2017-04-09 PROCEDURE — A6402 STERILE GAUZE <= 16 SQ IN: HCPCS | Performed by: SURGERY

## 2017-04-09 PROCEDURE — 502571 HCHG PACK, LAP CHOLE: Performed by: SURGERY

## 2017-04-09 PROCEDURE — 700111 HCHG RX REV CODE 636 W/ 250 OVERRIDE (IP): Performed by: EMERGENCY MEDICINE

## 2017-04-09 PROCEDURE — 160039 HCHG SURGERY MINUTES - EA ADDL 1 MIN LEVEL 3: Performed by: SURGERY

## 2017-04-09 PROCEDURE — 501586 HCHG TROCAR, THRD SPIKE 5X55: Performed by: SURGERY

## 2017-04-09 PROCEDURE — 700102 HCHG RX REV CODE 250 W/ 637 OVERRIDE(OP): Performed by: NURSE PRACTITIONER

## 2017-04-09 PROCEDURE — A4606 OXYGEN PROBE USED W OXIMETER: HCPCS | Performed by: SURGERY

## 2017-04-09 PROCEDURE — 160036 HCHG PACU - EA ADDL 30 MINS PHASE I: Performed by: SURGERY

## 2017-04-09 RX ORDER — OXYCODONE HYDROCHLORIDE 5 MG/1
5 TABLET ORAL EVERY 4 HOURS PRN
Status: DISCONTINUED | OUTPATIENT
Start: 2017-04-09 | End: 2017-04-09

## 2017-04-09 RX ORDER — BUPIVACAINE HYDROCHLORIDE 2.5 MG/ML
INJECTION, SOLUTION EPIDURAL; INFILTRATION; INTRACAUDAL
Status: DISCONTINUED | OUTPATIENT
Start: 2017-04-09 | End: 2017-04-09 | Stop reason: HOSPADM

## 2017-04-09 RX ORDER — OXYCODONE HCL 5 MG/5 ML
SOLUTION, ORAL ORAL
Status: COMPLETED
Start: 2017-04-09 | End: 2017-04-09

## 2017-04-09 RX ORDER — HYDROCODONE BITARTRATE AND ACETAMINOPHEN 5; 325 MG/1; MG/1
1-2 TABLET ORAL EVERY 6 HOURS PRN
Status: DISCONTINUED | OUTPATIENT
Start: 2017-04-09 | End: 2017-04-10

## 2017-04-09 RX ORDER — ONDANSETRON 2 MG/ML
4 INJECTION INTRAMUSCULAR; INTRAVENOUS EVERY 4 HOURS PRN
Status: DISCONTINUED | OUTPATIENT
Start: 2017-04-09 | End: 2017-04-12 | Stop reason: HOSPADM

## 2017-04-09 RX ORDER — ONDANSETRON 2 MG/ML
INJECTION INTRAMUSCULAR; INTRAVENOUS
Status: COMPLETED
Start: 2017-04-09 | End: 2017-04-09

## 2017-04-09 RX ADMIN — PIPERACILLIN AND TAZOBACTAM 3.38 G: 3; .375 INJECTION, POWDER, LYOPHILIZED, FOR SOLUTION INTRAVENOUS; PARENTERAL at 23:17

## 2017-04-09 RX ADMIN — AMLODIPINE BESYLATE 10 MG: 10 TABLET ORAL at 21:09

## 2017-04-09 RX ADMIN — PIPERACILLIN AND TAZOBACTAM 3.38 G: 3; .375 INJECTION, POWDER, LYOPHILIZED, FOR SOLUTION INTRAVENOUS; PARENTERAL at 17:34

## 2017-04-09 RX ADMIN — ONDANSETRON 4 MG: 2 INJECTION, SOLUTION INTRAMUSCULAR; INTRAVENOUS at 08:48

## 2017-04-09 RX ADMIN — HYDROMORPHONE HYDROCHLORIDE 1 MG: 1 INJECTION, SOLUTION INTRAMUSCULAR; INTRAVENOUS; SUBCUTANEOUS at 06:05

## 2017-04-09 RX ADMIN — PIPERACILLIN AND TAZOBACTAM 3.38 G: 3; .375 INJECTION, POWDER, LYOPHILIZED, FOR SOLUTION INTRAVENOUS; PARENTERAL at 05:56

## 2017-04-09 RX ADMIN — HYDROCODONE BITARTRATE AND ACETAMINOPHEN 2 TABLET: 5; 325 TABLET ORAL at 15:07

## 2017-04-09 RX ADMIN — HYDROCODONE BITARTRATE AND ACETAMINOPHEN 2 TABLET: 5; 325 TABLET ORAL at 21:09

## 2017-04-09 RX ADMIN — LORATADINE 10 MG: 10 TABLET ORAL at 10:22

## 2017-04-09 RX ADMIN — HYDROMORPHONE HYDROCHLORIDE 1 MG: 1 INJECTION, SOLUTION INTRAMUSCULAR; INTRAVENOUS; SUBCUTANEOUS at 12:42

## 2017-04-09 RX ADMIN — PHENYTOIN 100 MG: 50 TABLET, CHEWABLE ORAL at 21:09

## 2017-04-09 RX ADMIN — ATENOLOL 50 MG: 50 TABLET ORAL at 10:22

## 2017-04-09 RX ADMIN — FENTANYL CITRATE 50 MCG: 50 INJECTION, SOLUTION INTRAMUSCULAR; INTRAVENOUS at 07:50

## 2017-04-09 RX ADMIN — HYDROMORPHONE HYDROCHLORIDE 1 MG: 1 INJECTION, SOLUTION INTRAMUSCULAR; INTRAVENOUS; SUBCUTANEOUS at 18:41

## 2017-04-09 RX ADMIN — PIPERACILLIN AND TAZOBACTAM 3.38 G: 3; .375 INJECTION, POWDER, LYOPHILIZED, FOR SOLUTION INTRAVENOUS; PARENTERAL at 11:42

## 2017-04-09 RX ADMIN — OXYCODONE HYDROCHLORIDE 5 MG: 5 SOLUTION ORAL at 08:40

## 2017-04-09 ASSESSMENT — ENCOUNTER SYMPTOMS
DIARRHEA: 0
ABDOMINAL PAIN: 1
FEVER: 0
CHILLS: 0
NAUSEA: 0
VOMITING: 0

## 2017-04-09 ASSESSMENT — PAIN SCALES - GENERAL
PAINLEVEL_OUTOF10: 0
PAINLEVEL_OUTOF10: 0
PAINLEVEL_OUTOF10: 3
PAINLEVEL_OUTOF10: 7
PAINLEVEL_OUTOF10: 4
PAINLEVEL_OUTOF10: 5
PAINLEVEL_OUTOF10: 5
PAINLEVEL_OUTOF10: 7
PAINLEVEL_OUTOF10: 5
PAINLEVEL_OUTOF10: ASSUMED PAIN PRESENT
PAINLEVEL_OUTOF10: 7
PAINLEVEL_OUTOF10: 7

## 2017-04-09 NOTE — CARE PLAN
Problem: Safety  Goal: Will remain free from injury  Updated about POC. Reinforce call light use. Pt acknowledged understanding.     Problem: Infection  Goal: Will remain free from infection  Iv abx continued. Am labs ordered.     Problem: Respiratory:  Goal: Respiratory status will improve  Encouraged IS use. Pulls about 1000ml. Will try to wean down O2 to keep sat >90%.

## 2017-04-09 NOTE — PROGRESS NOTES
Gastroenterology Progress Note     Author: Kobe Salazar   Date & Time Created: 2017 12:36 PM    Interval History:  CC - Choledochalithiasis    Lap joey done. Sore at the port sites but feeling fine.    Review of Systems:  Review of Systems   Constitutional: Negative for fever and chills.   Cardiovascular: Negative for chest pain.   Gastrointestinal: Positive for abdominal pain. Negative for nausea, vomiting, diarrhea and melena.       Physical Exam:  Physical Exam   Constitutional: She is oriented to person, place, and time. She appears well-developed and well-nourished. No distress.   HENT:   Head: Atraumatic.   Eyes: EOM are normal.   Abdominal: Soft. She exhibits no distension. There is no tenderness. There is no rebound.   Steri-strips over port sites   Neurological: She is alert and oriented to person, place, and time.   Skin: Skin is warm and dry.   Psychiatric: She has a normal mood and affect. Her behavior is normal. Thought content normal.       Labs:        Invalid input(s): [id number]      Recent Labs      17   SODIUM  135  136   POTASSIUM  3.3*  3.9   CHLORIDE  106  110   CO2  21  20   BUN  16  3*   CREATININE  0.89  0.63   CALCIUM  8.1*  8.0*     Recent Labs      17   ALTSGPT  82*  76*   ASTSGOT  129*  127*   ALKPHOSPHAT  175*  224*   TBILIRUBIN  3.9*  2.2*   LIPASE   --   26   GLUCOSE  66  79     Recent Labs      17   RBC  3.85*   HEMOGLOBIN  12.0   HEMATOCRIT  37.1   PLATELETCT  148*     Recent Labs      17   WBC   --   4.8   --    NEUTSPOLYS   --   73.60*   --    LYMPHOCYTES   --   12.50*   --    MONOCYTES   --   9.60   --    EOSINOPHILS   --   2.90   --    BASOPHILS   --   0.80   --    ASTSGOT  129*   --   127*   ALTSGPT  82*   --   76*   ALKPHOSPHAT  175*   --   224*   TBILIRUBIN  3.9*   --   2.2*     Hemodynamics:  Temp (24hrs), Av.6 °C (97.9 °F), Min:36 °C (96.8  °F), Max:36.9 °C (98.4 °F)  Temperature: 36.2 °C (97.1 °F)  Pulse  Av  Min: 55  Max: 83Heart Rate (Monitored): 69  Blood Pressure : 129/68 mmHg, NIBP: 147/85 mmHg     Respiratory:    Respiration: 16, Pulse Oximetry: 94 %        RUL Breath Sounds: Clear, RML Breath Sounds: Diminished, RLL Breath Sounds: Diminished, SHIRIN Breath Sounds: Clear, LLL Breath Sounds: Diminished  Fluids:    Intake/Output Summary (Last 24 hours) at 17 1122  Last data filed at 17 0900   Gross per 24 hour   Intake   2575 ml   Output      0 ml   Net   2575 ml        GI/Nutrition:  Orders Placed This Encounter   Procedures   • DIET ORDER     Standing Status: Standing      Number of Occurrences: 1      Standing Expiration Date:      Order Specific Question:  Diet:     Answer:  Clear Liquids - No Red Foods [12]     Medical Decision Making, by Problem:  Active Hospital Problems    Diagnosis   • Acute cholecystitis [K81.0]     Impression and Plan -     1) Choledochalith -s/p ERCP with good results. .   2) Cholecystitis - s/p lap joey  3) Elevated liver enzymes - resolving    Will sign off. No need to follow up with GI    Kobe Salazar MD        Core Measures

## 2017-04-09 NOTE — PROGRESS NOTES
Assumed care at 1845. Pt resting in bed. A&Ox 4. Clears for now. NPO at midnight for surgery in AM. Denies n/v. O2 @ 0.5LNC. desats to low 80s on RA. Encouraged IS use. Ambulating with 1 person assist. Voiding well. C/o new rt knee pain. Ice pack given for now. +flatus. No bm yet. Pt refused bed alarm. Calls appropriately. Call light within reach. Hourly rounding in place.

## 2017-04-09 NOTE — OP REPORT
DATE OF SERVICE:  04/09/2017    PREOPERATIVE DIAGNOSES:  Choledocholithiasis and cholelithiasis.    POSTOPERATIVE DIAGNOSES:  Choledocholithiasis and cholelithiasis.    PROCEDURE:  Laparoscopic cholecystectomy.    SURGEON:  Indu Lynn MD    MEDICATIONS:  TIFFANIE Ruiz    ANESTHESIA:  General endotracheal.    ANESTHESIOLOGIST:  Nikko Tamayo MD    INDICATIONS:  The patient is a 66-year-old woman who presented a few days ago   with abdominal pain and was found to have cholelithiasis as well as   choledocholithiasis.  She had an ERCP yesterday to clear her duct.  She is   being brought at this time now for laparoscopic cholecystectomy.  Findings   were evidence of chronic cholecystitis, single duct, single artery identified.    DESCRIPTION OF PROCEDURE:  After patient was identified and consented, she was   brought to the operating room and placed in supine position.  Patient   underwent general endotracheal anesthetic clearance.  Patient's abdomen was   prepped and draped in sterile fashion.  The abdominal wall was lifted up and a   Veress needle was introduced into the abdominal cavity.  After positive drop   test, pneumoperitoneum was obtained.  Veress needle was removed.  A 5 mm   trocar was placed.  Under laparoscopic guidance, a 10 mm trocar was placed in   epigastric position and two 5 mm trocars were placed in the right subcostal   position.  Adhesions were taken down.  The duct and surrounding tissues doubly   clipped and transected.  The gallbladder was excised from the liver bed using   electrocautery, not entered in the process of excision, was brought through   the epigastric port, liver bed was irrigated and hemostasis secured.  Port   sites removed and pneumoperitoneum released.  All port sites were closed with   4-0 Vicryls.  Op-Site dressing placed on the wounds.  Patient was extubated   and taken to recovery in stable condition.  All sponge and needle counts were   correct.        ____________________________________     MD ISIDRA MOE / HARRIET    DD:  04/09/2017 07:37:53  DT:  04/09/2017 07:53:47    D#:  [id number]  Job#:  [id number]    cc: Nikko Tamayo MD

## 2017-04-10 LAB
ALBUMIN SERPL BCP-MCNC: 2.9 G/DL (ref 3.2–4.9)
ALBUMIN/GLOB SERPL: 1.5 G/DL
ALP SERPL-CCNC: 159 U/L (ref 30–99)
ALT SERPL-CCNC: 58 U/L (ref 2–50)
ANION GAP SERPL CALC-SCNC: 4 MMOL/L (ref 0–11.9)
AST SERPL-CCNC: 68 U/L (ref 12–45)
BASOPHILS # BLD AUTO: 0.7 % (ref 0–1.8)
BASOPHILS # BLD: 0.03 K/UL (ref 0–0.12)
BILIRUB SERPL-MCNC: 2.1 MG/DL (ref 0.1–1.5)
BUN SERPL-MCNC: 3 MG/DL (ref 8–22)
CALCIUM SERPL-MCNC: 8.2 MG/DL (ref 8.5–10.5)
CHLORIDE SERPL-SCNC: 103 MMOL/L (ref 96–112)
CO2 SERPL-SCNC: 28 MMOL/L (ref 20–33)
CREAT SERPL-MCNC: 0.64 MG/DL (ref 0.5–1.4)
EOSINOPHIL # BLD AUTO: 0.17 K/UL (ref 0–0.51)
EOSINOPHIL NFR BLD: 3.8 % (ref 0–6.9)
ERYTHROCYTE [DISTWIDTH] IN BLOOD BY AUTOMATED COUNT: 46.3 FL (ref 35.9–50)
GFR SERPL CREATININE-BSD FRML MDRD: >60 ML/MIN/1.73 M 2
GLOBULIN SER CALC-MCNC: 2 G/DL (ref 1.9–3.5)
GLUCOSE SERPL-MCNC: 113 MG/DL (ref 65–99)
HCT VFR BLD AUTO: 33 % (ref 37–47)
HGB BLD-MCNC: 10.7 G/DL (ref 12–16)
IMM GRANULOCYTES # BLD AUTO: 0.03 K/UL (ref 0–0.11)
IMM GRANULOCYTES NFR BLD AUTO: 0.7 % (ref 0–0.9)
LYMPHOCYTES # BLD AUTO: 0.61 K/UL (ref 1–4.8)
LYMPHOCYTES NFR BLD: 13.8 % (ref 22–41)
MCH RBC QN AUTO: 31.7 PG (ref 27–33)
MCHC RBC AUTO-ENTMCNC: 32.4 G/DL (ref 33.6–35)
MCV RBC AUTO: 97.6 FL (ref 81.4–97.8)
MONOCYTES # BLD AUTO: 0.49 K/UL (ref 0–0.85)
MONOCYTES NFR BLD AUTO: 11.1 % (ref 0–13.4)
NEUTROPHILS # BLD AUTO: 3.1 K/UL (ref 2–7.15)
NEUTROPHILS NFR BLD: 69.9 % (ref 44–72)
NRBC # BLD AUTO: 0 K/UL
NRBC BLD AUTO-RTO: 0 /100 WBC
PLATELET # BLD AUTO: 154 K/UL (ref 164–446)
PMV BLD AUTO: 9.9 FL (ref 9–12.9)
POTASSIUM SERPL-SCNC: 3.1 MMOL/L (ref 3.6–5.5)
PROT SERPL-MCNC: 4.9 G/DL (ref 6–8.2)
RBC # BLD AUTO: 3.38 M/UL (ref 4.2–5.4)
SODIUM SERPL-SCNC: 135 MMOL/L (ref 135–145)
WBC # BLD AUTO: 4.4 K/UL (ref 4.8–10.8)

## 2017-04-10 PROCEDURE — 700105 HCHG RX REV CODE 258: Performed by: SURGERY

## 2017-04-10 PROCEDURE — 700111 HCHG RX REV CODE 636 W/ 250 OVERRIDE (IP): Performed by: SURGERY

## 2017-04-10 PROCEDURE — 80053 COMPREHEN METABOLIC PANEL: CPT

## 2017-04-10 PROCEDURE — 85025 COMPLETE CBC W/AUTO DIFF WBC: CPT

## 2017-04-10 PROCEDURE — 700102 HCHG RX REV CODE 250 W/ 637 OVERRIDE(OP): Performed by: NURSE PRACTITIONER

## 2017-04-10 PROCEDURE — 700111 HCHG RX REV CODE 636 W/ 250 OVERRIDE (IP): Performed by: EMERGENCY MEDICINE

## 2017-04-10 PROCEDURE — 36415 COLL VENOUS BLD VENIPUNCTURE: CPT

## 2017-04-10 PROCEDURE — A9270 NON-COVERED ITEM OR SERVICE: HCPCS | Performed by: NURSE PRACTITIONER

## 2017-04-10 PROCEDURE — 770006 HCHG ROOM/CARE - MED/SURG/GYN SEMI*

## 2017-04-10 PROCEDURE — A9270 NON-COVERED ITEM OR SERVICE: HCPCS | Performed by: SURGERY

## 2017-04-10 PROCEDURE — 700102 HCHG RX REV CODE 250 W/ 637 OVERRIDE(OP): Performed by: SURGERY

## 2017-04-10 RX ORDER — KETOROLAC TROMETHAMINE 30 MG/ML
30 INJECTION, SOLUTION INTRAMUSCULAR; INTRAVENOUS EVERY 6 HOURS PRN
Status: DISCONTINUED | OUTPATIENT
Start: 2017-04-10 | End: 2017-04-10

## 2017-04-10 RX ORDER — KETOROLAC TROMETHAMINE 30 MG/ML
30 INJECTION, SOLUTION INTRAMUSCULAR; INTRAVENOUS EVERY 6 HOURS
Status: DISCONTINUED | OUTPATIENT
Start: 2017-04-10 | End: 2017-04-12 | Stop reason: HOSPADM

## 2017-04-10 RX ORDER — HYDROCODONE BITARTRATE AND ACETAMINOPHEN 5; 325 MG/1; MG/1
1-2 TABLET ORAL EVERY 4 HOURS PRN
Status: DISCONTINUED | OUTPATIENT
Start: 2017-04-10 | End: 2017-04-12 | Stop reason: HOSPADM

## 2017-04-10 RX ORDER — OXYCODONE HYDROCHLORIDE 5 MG/1
5 TABLET ORAL EVERY 4 HOURS PRN
Status: DISCONTINUED | OUTPATIENT
Start: 2017-04-10 | End: 2017-04-12 | Stop reason: HOSPADM

## 2017-04-10 RX ADMIN — HYDROCODONE BITARTRATE AND ACETAMINOPHEN 2 TABLET: 5; 325 TABLET ORAL at 20:54

## 2017-04-10 RX ADMIN — HYDROMORPHONE HYDROCHLORIDE 1 MG: 1 INJECTION, SOLUTION INTRAMUSCULAR; INTRAVENOUS; SUBCUTANEOUS at 02:08

## 2017-04-10 RX ADMIN — PIPERACILLIN AND TAZOBACTAM 3.38 G: 3; .375 INJECTION, POWDER, LYOPHILIZED, FOR SOLUTION INTRAVENOUS; PARENTERAL at 18:14

## 2017-04-10 RX ADMIN — KETOROLAC TROMETHAMINE 30 MG: 30 INJECTION, SOLUTION INTRAMUSCULAR at 18:14

## 2017-04-10 RX ADMIN — KETOROLAC TROMETHAMINE 30 MG: 30 INJECTION, SOLUTION INTRAMUSCULAR at 11:11

## 2017-04-10 RX ADMIN — ATENOLOL 50 MG: 50 TABLET ORAL at 08:39

## 2017-04-10 RX ADMIN — SODIUM CHLORIDE: 9 INJECTION, SOLUTION INTRAVENOUS at 11:16

## 2017-04-10 RX ADMIN — PIPERACILLIN AND TAZOBACTAM 3.38 G: 3; .375 INJECTION, POWDER, LYOPHILIZED, FOR SOLUTION INTRAVENOUS; PARENTERAL at 11:11

## 2017-04-10 RX ADMIN — PHENYTOIN 100 MG: 50 TABLET, CHEWABLE ORAL at 20:54

## 2017-04-10 RX ADMIN — HYDROCODONE BITARTRATE AND ACETAMINOPHEN 2 TABLET: 5; 325 TABLET ORAL at 05:06

## 2017-04-10 RX ADMIN — HYDROCODONE BITARTRATE AND ACETAMINOPHEN 2 TABLET: 5; 325 TABLET ORAL at 14:23

## 2017-04-10 RX ADMIN — PIPERACILLIN AND TAZOBACTAM 3.38 G: 3; .375 INJECTION, POWDER, LYOPHILIZED, FOR SOLUTION INTRAVENOUS; PARENTERAL at 05:06

## 2017-04-10 RX ADMIN — HYDROCODONE BITARTRATE AND ACETAMINOPHEN 2 TABLET: 5; 325 TABLET ORAL at 10:07

## 2017-04-10 RX ADMIN — LORATADINE 10 MG: 10 TABLET ORAL at 08:39

## 2017-04-10 ASSESSMENT — PAIN SCALES - GENERAL
PAINLEVEL_OUTOF10: 5
PAINLEVEL_OUTOF10: ASSUMED PAIN PRESENT
PAINLEVEL_OUTOF10: 6
PAINLEVEL_OUTOF10: ASSUMED PAIN PRESENT
PAINLEVEL_OUTOF10: 7

## 2017-04-10 ASSESSMENT — ENCOUNTER SYMPTOMS: ABDOMINAL PAIN: 1

## 2017-04-10 NOTE — PROGRESS NOTES
AA&O x 4.  Ambulates with SBA.  Tolerating regular diet, denies nausea.  C/o 7/10 pain in upper abdomen - Dr. Lynn aware - new pain medication orders in.  4 lap stabs with tegaderm dressings CDI.  +bs +flatus -BM.  Voiding s/q clear urine.  VSS.  SpO2: 96% on 1 L O2.  Encouraged use of IS 10 x hourly- able to pull 1000 mL.

## 2017-04-10 NOTE — CARE PLAN
Problem: Pain Management  Goal: Pain level will decrease to patient’s comfort goal  Outcome: PROGRESSING AS EXPECTED  Pt has pain in abdomen, receiving PRN pain meds per MAR, repositioned for comfort. Non-pharmacologic options offered.    Problem: Bowel/Gastric:  Goal: Normal bowel function is maintained or improved  Outcome: PROGRESSING AS EXPECTED  Pt has pain in abdomen. Pt's last BM was pta. Surgery today. + flatus. No bowel meds ordered. Will pass on to day shift.

## 2017-04-10 NOTE — PROGRESS NOTES
"  Trauma/Surgical Progress Note    Author: Indu Lynn Date & Time created: 4/10/2017   9:01 AM     Interval Events:  SP lap joey. Having pain issues. Manipulate meds. Adv diet.     Review of Systems   Gastrointestinal: Positive for abdominal pain.     Hemodynamics:  Blood pressure 102/64, pulse 65, temperature 36.1 °C (97 °F), resp. rate 16, height 1.651 m (5' 5\"), weight 69.3 kg (152 lb 12.5 oz), SpO2 96 %, not currently breastfeeding.     Respiratory:    Respiration: 16, Pulse Oximetry: 96 %        RUL Breath Sounds: Inspiratory Wheezes, RML Breath Sounds: Clear, RLL Breath Sounds: Diminished, SHIRIN Breath Sounds: Inspiratory Wheezes, LLL Breath Sounds: Diminished  Fluids:    Intake/Output Summary (Last 24 hours) at 04/10/17 0901  Last data filed at 04/10/17 0710   Gross per 24 hour   Intake   1315 ml   Output    500 ml   Net    815 ml     Admit Weight: 66.225 kg (146 lb)  Current      Physical Exam   Constitutional: She appears well-developed and well-nourished.   HENT:   Head: Normocephalic.   Neck: Neck supple.   Cardiovascular: Normal rate.    Pulmonary/Chest: Effort normal.   Abdominal: Soft. She exhibits no distension. There is tenderness.   Dressings dry   Musculoskeletal: Normal range of motion.   Neurological: She is alert.   Skin: Skin is warm.       Medical Decision Making/Problem List:    Active Hospital Problems    Diagnosis   • Acute cholecystitis [K81.0]     Priority: High     4/6 - MRCP shows choledocholithiasis  4/8 -  ERCP  4/9 - lap joey  LFTs down       Core Measures & Quality Metrics:  Labs reviewed and Medications reviewed  Livingston catheter: No Livingston        DVT prophylaxis - mechanical: SCDs  Ulcer prophylaxis: Yes  Antibiotics: Treating active infection/contamination beyond 24 hours perioperative coverage      HEIDI Score  Discussed patient condition with Family, RN and Patient.  CRITICAL CARE TIME EXCLUDING PROCEDURES: 20    minutes      "

## 2017-04-10 NOTE — PROGRESS NOTES
Report received, poc discussed, assumed care of pt.   Call light in reach, hourly rounding in place.   Pt gets up SBA.   Clear  liq diet, ADAT.  + void. LBM PTA, + flatus. Surgery today.  Norco/dilaudid for pain. Asks for pain meds when does not appear in pain, per day shift.  No further needs.

## 2017-04-10 NOTE — CARE PLAN
Problem: Nutritional:  Goal: Achieve adequate nutritional intake  Diet to advance and Patient will consume 50% of meals   Outcome: PROGRESSING SLOWER THAN EXPECTED

## 2017-04-11 PROCEDURE — 700105 HCHG RX REV CODE 258: Performed by: SURGERY

## 2017-04-11 PROCEDURE — 700111 HCHG RX REV CODE 636 W/ 250 OVERRIDE (IP): Performed by: SURGERY

## 2017-04-11 PROCEDURE — 700102 HCHG RX REV CODE 250 W/ 637 OVERRIDE(OP): Performed by: SURGERY

## 2017-04-11 PROCEDURE — 770006 HCHG ROOM/CARE - MED/SURG/GYN SEMI*

## 2017-04-11 PROCEDURE — 700102 HCHG RX REV CODE 250 W/ 637 OVERRIDE(OP): Performed by: PHYSICIAN ASSISTANT

## 2017-04-11 PROCEDURE — A9270 NON-COVERED ITEM OR SERVICE: HCPCS | Performed by: PHYSICIAN ASSISTANT

## 2017-04-11 PROCEDURE — A9270 NON-COVERED ITEM OR SERVICE: HCPCS | Performed by: SURGERY

## 2017-04-11 RX ORDER — BISACODYL 10 MG
10 SUPPOSITORY, RECTAL RECTAL
Status: DISCONTINUED | OUTPATIENT
Start: 2017-04-11 | End: 2017-04-12 | Stop reason: HOSPADM

## 2017-04-11 RX ORDER — POLYETHYLENE GLYCOL 3350 17 G/17G
1 POWDER, FOR SOLUTION ORAL
Status: DISCONTINUED | OUTPATIENT
Start: 2017-04-11 | End: 2017-04-12 | Stop reason: HOSPADM

## 2017-04-11 RX ORDER — HYDROCODONE BITARTRATE AND ACETAMINOPHEN 5; 325 MG/1; MG/1
1-2 TABLET ORAL EVERY 4 HOURS PRN
Qty: 30 TAB | Refills: 0 | Status: SHIPPED | OUTPATIENT
Start: 2017-04-11

## 2017-04-11 RX ORDER — AMOXICILLIN 250 MG
2 CAPSULE ORAL 2 TIMES DAILY
Status: DISCONTINUED | OUTPATIENT
Start: 2017-04-11 | End: 2017-04-12 | Stop reason: HOSPADM

## 2017-04-11 RX ADMIN — ATENOLOL 50 MG: 50 TABLET ORAL at 08:41

## 2017-04-11 RX ADMIN — HYDROCODONE BITARTRATE AND ACETAMINOPHEN 2 TABLET: 5; 325 TABLET ORAL at 08:40

## 2017-04-11 RX ADMIN — PIPERACILLIN AND TAZOBACTAM 3.38 G: 3; .375 INJECTION, POWDER, LYOPHILIZED, FOR SOLUTION INTRAVENOUS; PARENTERAL at 23:57

## 2017-04-11 RX ADMIN — KETOROLAC TROMETHAMINE 30 MG: 30 INJECTION, SOLUTION INTRAMUSCULAR at 18:02

## 2017-04-11 RX ADMIN — POLYETHYLENE GLYCOL 3350 1 PACKET: 17 POWDER, FOR SOLUTION ORAL at 20:06

## 2017-04-11 RX ADMIN — PIPERACILLIN AND TAZOBACTAM 3.38 G: 3; .375 INJECTION, POWDER, LYOPHILIZED, FOR SOLUTION INTRAVENOUS; PARENTERAL at 00:48

## 2017-04-11 RX ADMIN — PIPERACILLIN AND TAZOBACTAM 3.38 G: 3; .375 INJECTION, POWDER, LYOPHILIZED, FOR SOLUTION INTRAVENOUS; PARENTERAL at 18:03

## 2017-04-11 RX ADMIN — PIPERACILLIN AND TAZOBACTAM 3.38 G: 3; .375 INJECTION, POWDER, LYOPHILIZED, FOR SOLUTION INTRAVENOUS; PARENTERAL at 05:35

## 2017-04-11 RX ADMIN — KETOROLAC TROMETHAMINE 30 MG: 30 INJECTION, SOLUTION INTRAMUSCULAR at 05:36

## 2017-04-11 RX ADMIN — STANDARDIZED SENNA CONCENTRATE AND DOCUSATE SODIUM 2 TABLET: 8.6; 5 TABLET, FILM COATED ORAL at 20:01

## 2017-04-11 RX ADMIN — PHENYTOIN 100 MG: 50 TABLET, CHEWABLE ORAL at 20:01

## 2017-04-11 RX ADMIN — HYDROCODONE BITARTRATE AND ACETAMINOPHEN 2 TABLET: 5; 325 TABLET ORAL at 20:01

## 2017-04-11 RX ADMIN — HYDROCODONE BITARTRATE AND ACETAMINOPHEN 2 TABLET: 5; 325 TABLET ORAL at 13:24

## 2017-04-11 RX ADMIN — HYDROCODONE BITARTRATE AND ACETAMINOPHEN 2 TABLET: 5; 325 TABLET ORAL at 03:08

## 2017-04-11 RX ADMIN — PIPERACILLIN AND TAZOBACTAM 3.38 G: 3; .375 INJECTION, POWDER, LYOPHILIZED, FOR SOLUTION INTRAVENOUS; PARENTERAL at 12:19

## 2017-04-11 RX ADMIN — KETOROLAC TROMETHAMINE 30 MG: 30 INJECTION, SOLUTION INTRAMUSCULAR at 00:48

## 2017-04-11 RX ADMIN — KETOROLAC TROMETHAMINE 30 MG: 30 INJECTION, SOLUTION INTRAMUSCULAR at 12:19

## 2017-04-11 RX ADMIN — LORATADINE 10 MG: 10 TABLET ORAL at 08:40

## 2017-04-11 RX ADMIN — STANDARDIZED SENNA CONCENTRATE AND DOCUSATE SODIUM 2 TABLET: 8.6; 5 TABLET, FILM COATED ORAL at 10:35

## 2017-04-11 RX ADMIN — KETOROLAC TROMETHAMINE 30 MG: 30 INJECTION, SOLUTION INTRAMUSCULAR at 23:57

## 2017-04-11 ASSESSMENT — PAIN SCALES - GENERAL
PAINLEVEL_OUTOF10: 5
PAINLEVEL_OUTOF10: 3
PAINLEVEL_OUTOF10: 4
PAINLEVEL_OUTOF10: ASSUMED PAIN PRESENT
PAINLEVEL_OUTOF10: 5
PAINLEVEL_OUTOF10: 5
PAINLEVEL_OUTOF10: 3
PAINLEVEL_OUTOF10: 5

## 2017-04-11 ASSESSMENT — ENCOUNTER SYMPTOMS
VOMITING: 0
FEVER: 0
ABDOMINAL PAIN: 1
CHILLS: 0
SHORTNESS OF BREATH: 0
NAUSEA: 0
COUGH: 0

## 2017-04-11 NOTE — DISCHARGE PLANNING
Medical Social Work    Referral: FWW     Intervention: Received MD order for FWW. Met w/ pt at bedside and she would like to think about whether or not she needs this and will let this SW'er know once she decides. Pt agreeable to sending referral to [facility], should she need a FWW for home.    Plan: Will continue to follow.

## 2017-04-11 NOTE — PROGRESS NOTES
Report received, poc discussed, assumed care of pt.    Call light in reach, hourly rounding in place.    Pt gets up SBA.    Reg diet.  + void. LBM PTA, + flatus.  Norco increased frequency, toradol added. Hasn't required dilaudid per day shift.  No further needs.  Possible DC tomorrow.

## 2017-04-11 NOTE — FACE TO FACE
Face to Face Note  -  Durable Medical Equipment    Fabiola Jeter PA-C - NPI: [id number]  I certify that this patient is under my care and that they had a durable medical equipment(DME)face to face encounter by myself that meets the physician DME face-to-face encounter requirements with this patient on:    Date of encounter:   Patient:                    MRN:                       YOB: 2017  Ning Inman  [id number]  1950     The encounter with the patient was in whole, or in part, for the following medical condition, which is the primary reason for durable medical equipment:  Post-Op Surgery    I certify that, based on my findings, the following durable medical equipment is medically necessary:  Walkers.    HOME O2 Saturation Measurements:(Values must be present for Home Oxygen orders)         ,     ,         My Clinical findings support the need for the above equipment due to:  Abnormal Gait    Supporting Symptoms: Unsteady gait, needs assistance for ambulation

## 2017-04-11 NOTE — DISCHARGE PLANNING
Received DME choice from Tamara([facility]) at 1225.  DME referral sent to [facility].

## 2017-04-11 NOTE — PROGRESS NOTES
Surgical Progress Note    Author: Fabiola Jeter Date & Time created: 2017   11:04 AM     Interval Events:  POD #2 s/p lap joey  POD #3 s/p ERCP    Doing well. Afebrile.  Still has epigastric pain - controlled with medication.  Tolerating regular diet.  LFTs, alk phos and bilirubin improved.  Ambulating with walker - walker ordered for ome.  Anticipate discharge home today.  Follow up with Dr. Lynn in clinic next week.     Review of Systems   Constitutional: Negative for fever and chills.   Respiratory: Negative for cough and shortness of breath.    Cardiovascular: Negative for chest pain.   Gastrointestinal: Positive for abdominal pain. Negative for nausea and vomiting.   All other systems reviewed and are negative.    Hemodynamics:  Temp (24hrs), Av.3 °C (97.4 °F), Min:36 °C (96.8 °F), Max:36.6 °C (97.9 °F)  Temperature: 36.4 °C (97.5 °F)  Pulse  Av.6  Min: 53  Max: 87   Blood Pressure : 105/64 mmHg     Respiratory:    Respiration: 17, Pulse Oximetry: 97 %     Work Of Breathing / Effort: Shallow  RLL Breath Sounds: Diminished, LLL Breath Sounds: Diminished  Neuro:  GCS       Fluids:    Intake/Output Summary (Last 24 hours) at 17 1104  Last data filed at 17 0915   Gross per 24 hour   Intake   2460 ml   Output   1450 ml   Net   1010 ml        Current Diet Order   Procedures   • DIET ORDER     Physical Exam   Constitutional: She appears well-developed and well-nourished. No distress.   HENT:   Head: Normocephalic.   Eyes: Pupils are equal, round, and reactive to light.   Neck: Normal range of motion.   Cardiovascular: Normal rate and regular rhythm.    Pulmonary/Chest: Effort normal.   Abdominal: Soft. She exhibits no distension. There is tenderness.   Incision clean and intact  Mild ecchymosis   Nursing note and vitals reviewed.    Labs:  No results found for this or any previous visit (from the past 24 hour(s)).  Medical Decision Making, by Problem:  Active Hospital Problems     Diagnosis   • Acute cholecystitis [K81.0]     Priority: High     4/6 - MRCP shows choledocholithiasis  4/8 -  ERCP  4/9 - lap joey  LFTs down       Plan:  POD #2 s/p lap joey  POD #3 s/p ERCP    Doing well. Afebrile.  Still has epigastric pain - controlled with medication.  Tolerating regular diet.  LFTs, alk phos and bilirubin improved.  Ambulating with walker - walker ordered for ome.  Anticipate discharge home today.  Follow up with Dr. Lynn in clinic next week.    Quality Measures:  Labs reviewed, Medications reviewed and Radiology images reviewed  Livingston catheter: No Livingston      DVT Prophylaxis: Enoxaparin (Lovenox)  DVT prophylaxis - mechanical: SCDs  Ulcer prophylaxis: Yes    Assessed for rehab: Patient returned to prior level of function, rehabilitation not indicated at this time      Discussed patient condition with RN, Patient and Dr. Lynn

## 2017-04-11 NOTE — PROGRESS NOTES
Pt ambulated with fww in benavides, c/o abd pain and stiff legs, mild dependent edema in upper legs, discussed frequent mobility. Pain 5/10 in abd, +flatus, will stay the night-pt not ready for discharge-needs more mobility and increased bowel function.

## 2017-04-11 NOTE — PROGRESS NOTES
Pt aao, medicated for ruq abd pain, denies n/v, tolerating regular diet, iv tko, mild edema in BLE-non pitting, shallow lung sounds, pulls IS to 1000, +flatus, no bm since PTA. poc discussed. Will assess if pt need fww for home.

## 2017-04-11 NOTE — CARE PLAN
Problem: Pain Management  Goal: Pain level will decrease to patient’s comfort goal  Outcome: PROGRESSING AS EXPECTED  Pt has pain in abdomen, receiving PRN pain meds per MAR, repositioned for comfort. Non-pharmacologic options offered.    Problem: Bowel/Gastric:  Goal: Normal bowel function is maintained or improved  Outcome: PROGRESSING AS EXPECTED  + flatus, - BM.

## 2017-04-12 VITALS
BODY MASS INDEX: 25.45 KG/M2 | HEIGHT: 65 IN | OXYGEN SATURATION: 94 % | DIASTOLIC BLOOD PRESSURE: 82 MMHG | SYSTOLIC BLOOD PRESSURE: 160 MMHG | RESPIRATION RATE: 18 BRPM | TEMPERATURE: 98.6 F | HEART RATE: 60 BPM | WEIGHT: 152.78 LBS

## 2017-04-12 PROCEDURE — A9270 NON-COVERED ITEM OR SERVICE: HCPCS | Performed by: SURGERY

## 2017-04-12 PROCEDURE — 700102 HCHG RX REV CODE 250 W/ 637 OVERRIDE(OP): Performed by: SURGERY

## 2017-04-12 PROCEDURE — A9270 NON-COVERED ITEM OR SERVICE: HCPCS | Performed by: PHYSICIAN ASSISTANT

## 2017-04-12 PROCEDURE — 700105 HCHG RX REV CODE 258: Performed by: SURGERY

## 2017-04-12 PROCEDURE — 700111 HCHG RX REV CODE 636 W/ 250 OVERRIDE (IP): Performed by: SURGERY

## 2017-04-12 PROCEDURE — 700102 HCHG RX REV CODE 250 W/ 637 OVERRIDE(OP): Performed by: PHYSICIAN ASSISTANT

## 2017-04-12 RX ADMIN — LORATADINE 10 MG: 10 TABLET ORAL at 09:42

## 2017-04-12 RX ADMIN — KETOROLAC TROMETHAMINE 30 MG: 30 INJECTION, SOLUTION INTRAMUSCULAR at 05:30

## 2017-04-12 RX ADMIN — PIPERACILLIN AND TAZOBACTAM 3.38 G: 3; .375 INJECTION, POWDER, LYOPHILIZED, FOR SOLUTION INTRAVENOUS; PARENTERAL at 05:30

## 2017-04-12 RX ADMIN — ATENOLOL 50 MG: 50 TABLET ORAL at 09:43

## 2017-04-12 RX ADMIN — MAGNESIUM HYDROXIDE 30 ML: 400 SUSPENSION ORAL at 05:36

## 2017-04-12 ASSESSMENT — ENCOUNTER SYMPTOMS
SHORTNESS OF BREATH: 0
FEVER: 0
ABDOMINAL PAIN: 0
COUGH: 0
NAUSEA: 0
CHILLS: 0
VOMITING: 0

## 2017-04-12 ASSESSMENT — PAIN SCALES - GENERAL
PAINLEVEL_OUTOF10: 3
PAINLEVEL_OUTOF10: 3
PAINLEVEL_OUTOF10: ASSUMED PAIN PRESENT

## 2017-04-12 NOTE — DISCHARGE INSTRUCTIONS
Discharge Instructions    Discharged to home by car with relative. Discharged via walking, hospital escort: Refused.  Special equipment needed: Walker    Be sure to schedule a follow-up appointment with your primary care doctor or any specialists as instructed.     Discharge Plan:   Smoking Cessation Offered: Patient Counseled  Pneumococcal Vaccine Given - only chart on this line when given: Given (See MAR)  Influenza Vaccine Indication: Patient Refuses    I understand that a diet low in cholesterol, fat, and sodium is recommended for good health. Unless I have been given specific instructions below for another diet, I accept this instruction as my diet prescription.   Other diet: regular    Special Instructions:   DIET: Upon discharge from the hospital you may resume your normal preoperative diet. Depending on how you are feeling and whether you have nausea or not, you may wish to stay with a bland diet for the first few days. However, you can advance this as quickly as you feel ready.     ACTIVITIES: After discharge from the hospital, you may resume full routine activities. However, there should be no heavy lifting (greater than 15 pounds) and no strenuous activities until after your follow-up visit. Otherwise, routine activities of daily living are acceptable.     DRIVING: You may drive whenever you are off pain medications and are able to perform the activities needed to drive, i.e. turning, bending, twisting, etc.     BATHING: You may get the wound wet at any time after leaving the hospital. You may shower, but do not submerge in a bath for at least a week. Dressings may come off after 48 hours.     BOWEL FUNCTION: A few patients, after this operation, will develop either frequent or loose stools after meals. This usually corrects itself after a few days, to a few weeks. If this occurs, do not worry; it is not unusual and will resolve. Much more common than loose stools, is constipation. The combination of pain  medication and decreased activity level can cause constipation in otherwise normal patients. If you feel this is occurring, take a laxative (Milk of Magnesia, Ex-Lax, Senokot, etc.) until the problem has resolved.     PAIN MEDICATION: You will be given a prescription for pain medication at discharge. Please take these as directed. It is important to remember not to take medications on an empty stomach as this may cause nausea.     CALL IF YOU HAVE: (1) Fevers to more than 1010 F, (2) Unusual chest or leg pain, (3) Drainage or fluid from incision that may be foul smelling, increased tenderness or soreness at the wound or the wound edges are no longer together, redness or swelling at the incision site. Please do not hesitate to call with any other questions.     APPOINTMENT: Contact our office at [phone] for a follow-up appointment in 1 week following your procedure.     · Is patient discharged on Warfarin / Coumadin?   No     · Is patient Post Blood Transfusion?  No    Depression / Suicide Risk    As you are discharged from this [facility] facility, it is important to learn how to keep safe from harming yourself.    Recognize the warning signs:  · Abrupt changes in personality, positive or negative- including increase in energy   · Giving away possessions  · Change in eating patterns- significant weight changes-  positive or negative  · Change in sleeping patterns- unable to sleep or sleeping all the time   · Unwillingness or inability to communicate  · Depression  · Unusual sadness, discouragement and loneliness  · Talk of wanting to die  · Neglect of personal appearance   · Rebelliousness- reckless behavior  · Withdrawal from people/activities they love  · Confusion- inability to concentrate     If you or a loved one observes any of these behaviors or has concerns about self-harm, here's what you can do:  · Talk about it- your feelings and reasons for harming yourself  · Remove any means that you might use  to hurt yourself (examples: pills, rope, extension cords, firearm)  · Get professional help from the community (Mental Health, Substance Abuse, psychological counseling)  · Do not be alone:Call your Safe Contact- someone whom you trust who will be there for you.  · Call your local CRISIS HOTLINE 302-9295 or 527-627-7301  · Call your local Children's Mobile Crisis Response Team [place] (265) 330-7945 or www.Bueda  · Call the toll free National Suicide Prevention Hotlines   · National Suicide Prevention Lifeline 018-928-NTOR (3081)  · National Hope Line Network 800-SUICIDE (238-9801)      Endoscopic Retrograde Cholangiopancreatography (ERCP)  Endoscopic retrograde cholangiopancreatography (ERCP) is a procedure used to diagnosis many diseases of the pancreas, bile ducts, liver, and gallbladder. During ERCP a thin, lighted tube (endoscope) is passed through the mouth and down the back of the throat into the first part of the small intestine (duodenum). A small, plastic tube (cannula) is then passed through the endoscope and directed into the bile duct or pancreatic duct. Dye is then injected through the cannula and X-rays are taken to study the biliary and pancreatic passageways.   LET YOUR HEALTH CARE PROVIDER KNOW ABOUT:   · Any allergies you have.    · All medicines you are taking, including vitamins, herbs, eyedrops, creams, and over-the-counter medicines.    · Previous problems you or members of your family have had with the use of anesthetics.    · Any blood disorders you have.    · Previous surgeries you have had.    · Medical conditions you have.  RISKS AND COMPLICATIONS  Generally, ERCP is a safe procedure. However, as with any procedure, complications can occur. A simple removal of gallstones has the lowest rate of complications. Higher rates of complication occur in people who have poorly functioning bile or pancreatic ducts. Possible complications include:    · Pancreatitis.  · Bleeding.  · Accidental punctures in the bowel wall, pancreas, or gall bladder.  · Gall bladder or bile duct infection.  BEFORE THE PROCEDURE   · Do not eat or drink anything, including water, for at least 8 hours before the procedure or as directed by your health care provider.    · Ask your health care provider whether you should stop taking certain medicines prior to your procedure.    · Arrange for someone to drive you home. You will not be allowed to drive for 12-24 hours after the procedure.  PROCEDURE   · You will be given medicine through a vein (intravenously) to make you relaxed and sleepy.    · You might have a breathing tube placed to give you medicine that makes you sleep (general anesthetic).    · Your throat may be sprayed with medicine that numbs the area and prevents gagging (local anesthetic), or you may gargle this medicine.    · You will lie on your left side.    · The endoscope will be inserted through your mouth and into the duodenum. The tube will not interfere with your breathing. Gagging is prevented by the anesthesia.    · While X-rays are being taken, you may be positioned on your stomach.    · A small sample of tissue (biopsy) may be removed for examination.  AFTER THE PROCEDURE   · You will rest in bed until you are fully conscious.    · When you first wake up, your throat may feel slightly sore.    · You will not be allowed to eat or drink until numbness subsides.    · Once you are able to drink, urinate, and sit on the edge of the bed without feeling sick to your stomach (nauseous) or dizzy, you may be allowed to go home.     This information is not intended to replace advice given to you by your health care provider. Make sure you discuss any questions you have with your health care provider.     Document Released: 09/12/2002 Document Revised: 10/08/2014 Document Reviewed: 07/29/2014  [employer] Interactive Patient Education ©2016 [employer]    Laparoscopic  Cholecystectomy, Care After  Refer to this sheet in the next few weeks. These instructions provide you with information on caring for yourself after your procedure. Your health care provider may also give you more specific instructions. Your treatment has been planned according to current medical practices, but problems sometimes occur. Call your health care provider if you have any problems or questions after your procedure.  WHAT TO EXPECT AFTER THE PROCEDURE  After your procedure, it is typical to have the following:  · Pain at your incision sites. You will be given pain medicines to control the pain.  · Mild nausea or vomiting. This should improve after the first 24 hours.  · Bloating and possibly shoulder pain from the gas used during the procedure. This will improve after the first 24 hours.  HOME CARE INSTRUCTIONS   · Change bandages (dressings) as directed by your health care provider.  · Keep the wound dry and clean. You may wash the wound gently with soap and water. Gently blot or dab the area dry.  · Do not take baths or use swimming pools or hot tubs for 2 weeks or until your health care provider approves.  · Only take over-the-counter or prescription medicines as directed by your health care provider.  · Continue your normal diet as directed by your health care provider.  · Do not lift anything heavier than 10 pounds (4.5 kg) until your health care provider approves.  · Do not play contact sports for 1 week or until your health care provider approves.  SEEK MEDICAL CARE IF:   · You have redness, swelling, or increasing pain in the wound.  · You notice yellowish-white fluid (pus) coming from the wound.  · You have drainage from the wound that lasts longer than 1 day.  · You notice a bad smell coming from the wound or dressing.  · Your surgical cuts (incisions) break open.  SEEK IMMEDIATE MEDICAL CARE IF:   · You develop a rash.  · You have difficulty breathing.  · You have chest pain.  · You have a  fever.  · You have increasing pain in the shoulders (shoulder strap areas).  · You have dizzy episodes or faint while standing.  · You have severe abdominal pain.  · You feel sick to your stomach (nauseous) or throw up (vomit) and this lasts for more than 1 day.     This information is not intended to replace advice given to you by your health care provider. Make sure you discuss any questions you have with your health care provider.     Document Released: 12/18/2006 Document Revised: 10/08/2014 Document Reviewed: 07/30/2014  [employer] Interactive Patient Education ©2016 [employer]

## 2017-04-12 NOTE — PROGRESS NOTES
Report received, poc discussed, assumed care of pt.    Call light in reach, hourly rounding in place.    Pt gets up SBA.    Reg diet.  + void. LBM PTA, + flatus. Bowel meds ordered today, continue regimen,  Norco/toradol for pain.  No further needs.  Plan DC home tmrw with FWW.

## 2017-04-12 NOTE — PROGRESS NOTES
Surgical Progress Note    Author: Fabiola Jeter Date & Time created: 2017   2:00 PM     Interval Events:  POD #3 s/p lap joey  POD #4 s/p ERCP    Doing well. Afebrile.  Tolerating regular diet.  Discharge home today.  Follow up with Dr. Lynn in clinic next week.     Review of Systems   Constitutional: Negative for fever and chills.   Respiratory: Negative for cough and shortness of breath.    Cardiovascular: Negative for chest pain.   Gastrointestinal: Negative for nausea, vomiting and abdominal pain.   All other systems reviewed and are negative.    Hemodynamics:  Temp (24hrs), Av.7 °C (98 °F), Min:36.4 °C (97.5 °F), Max:37 °C (98.6 °F)  Temperature: 37 °C (98.6 °F)  Pulse  Av.4  Min: 53  Max: 87   Blood Pressure : 160/82 mmHg     Respiratory:    Respiration: 18, Pulse Oximetry: 94 %     Work Of Breathing / Effort: Shallow  RUL Breath Sounds: Diminished, RML Breath Sounds: Clear, RLL Breath Sounds: Clear, SHIRIN Breath Sounds: Diminished, LLL Breath Sounds: Diminished  Neuro:  GCS       Fluids:    Intake/Output Summary (Last 24 hours) at 17 1401  Last data filed at 17 1300   Gross per 24 hour   Intake   1040 ml   Output    600 ml   Net    440 ml            Current Diet Order   Procedures   • DIET ORDER     Physical Exam   Constitutional: She appears well-developed and well-nourished. No distress.   HENT:   Head: Normocephalic.   Eyes: Pupils are equal, round, and reactive to light.   Neck: Normal range of motion.   Cardiovascular: Normal rate and regular rhythm.    Pulmonary/Chest: Effort normal.   Abdominal: Soft. She exhibits no distension. There is no tenderness.   Incision clean and intact  Mild ecchymosis   Nursing note and vitals reviewed.    Labs:  No results found for this or any previous visit (from the past 24 hour(s)).  Medical Decision Making, by Problem:  Active Hospital Problems    Diagnosis   • Acute cholecystitis [K81.0]     Priority: High     4/6 - MRCP shows  choledocholithiasis  4/8 -  ERCP  4/9 - lap joey  LFTs down       Plan:  POD #3 s/p lap joey  POD #4 s/p ERCP    Doing well. Afebrile.  Tolerating regular diet.  Discharge home today.  Follow up with Dr. Lynn in clinic next week.    Quality Measures:  Labs reviewed, Medications reviewed and Radiology images reviewed  Livingston catheter: No Livingston      DVT Prophylaxis: Enoxaparin (Lovenox)  DVT prophylaxis - mechanical: SCDs  Ulcer prophylaxis: Yes    Assessed for rehab: Patient returned to prior level of function, rehabilitation not indicated at this time      Discussed patient condition with RN, Patient and Dr. Lynn

## 2017-04-12 NOTE — PROGRESS NOTES
Assumed care of patient at 0700 am. VSS. A&Ox4. Up ad debbie with stand by assist and FWW. Voiding without issue. Patient reporting two BMs and refusing further bowel meds. Lap sites intact. Rating pain 3/10. Discussed POC with patient and verbalized understanding. Plan for discharge home today.

## 2017-04-13 NOTE — DISCHARGE SUMMARY
DATE OF ADMISSION: 4/6/2017    DATE OF DISCHARGE: 4/12/2017    ADMITTING DIAGNOSES: Abdominal pain    DISCHARGE DIAGNOSES: Choledocholithiasis and cholecystitis    PROCEDURE(S):  1. Endoscopic retrograde cholangiopancreatography with biliary sphincterotomy and balloon stone extraction  2. Laparoscopic cholecystectomy    BRIEF HISTORY: The patient is a 66-year-old female who presented to the emergency department with abdominal pain. She was found to have cholelithiasis as well as choledocholithiasis. She was admitted and underwent an ERCP to clear her duct. She was taken to the operating room for laparoscopic cholecystectomy.     HOSPITAL COURSE: The patient was admitted to the surgical suite and taken to the operating room on 4/8/2017 where an ERCP with biliary sphincterotomy and balloon stone extraction was performed. On 4/9/2017, the patient returned to the operating room for laparoscopic cholecystectomy. The patient tolerated all procedures well. On POD #1, the patient was afebrile and all vital signs were stable, only complaining of postoperative wound pain. Her liver function tests normalized. On POD #3, the patient was afebrile and vital signs were stable. The patient was tolerating a regular diet and ambulating without difficulty. The patient’s pain was well controlled. The patient had minimal incisional wound tenderness. The patient was desirous of going home and was discharged home.    DISCHARGE CONDITION: Stable.    DISPOSITION: Discharged to home.    MEDICATIONS:   •  hydrocodone-acetaminophen (NORCO) 5-325 MG Tab, Take 1-2 Tabs PO Q 4 hours PRN PAIN  •  amlodipine (NORVASC) 10 MG Tab, Take 10 mg by mouth every day  •  phenytoin ER (DILANTIN) 100 MG Cap, Take 100 mg by mouth every bedtime   •  predniSONE (DELTASONE) 10 MG Tab, Take 10 mg by mouth as needed  •  atenolol (TENORMIN) 50 MG Tab, Take 50 mg by mouth every day  •  loratadine (CLARITIN) 10 MG Tab, Take 10 mg by mouth every  day    DISCHARGE INSTRUCTIONS: The  patient was discharged to home with instructions for maintaining a regular diet. They were instructed to keep their incision site clean and dry and it was also recommended that they avoid any heavy lifting or strenuous activities until after follow up. They were given a prescription for pain medication upon discharge. The patient will make a follow up appointment with Dr. Lynn for next week. The patient was instructed to contact [facility] or go to the emergency department if they have any signs of infection or other concerns.

## 2023-07-31 NOTE — CARE PLAN
Problem: Pain Management  Goal: Pain level will decrease to patient’s comfort goal  Outcome: PROGRESSING AS EXPECTED  Pt has pain in abdomen, receiving PRN pain meds per MAR, repositioned for comfort. Non-pharmacologic options offered.    Problem: Bowel/Gastric:  Goal: Normal bowel function is maintained or improved  Outcome: PROGRESSING AS EXPECTED  + flatus, - BM. Miralax and senna given.   none

## (undated) DEVICE — NEEDLE INSFL 120MM 14GA VRRS - (20/BX)

## (undated) DEVICE — CANISTER SUCTION 3000ML MECHANICAL FILTER AUTO SHUTOFF MEDI-VAC NONSTERILE LF DISP  (40EA/CA)

## (undated) DEVICE — ELECTRODE 850 FOAM ADHESIVE - HYDROGEL RADIOTRNSPRNT (50/PK)

## (undated) DEVICE — TROCAR5X55 KII SHIELDED SYS - (6/BX)

## (undated) DEVICE — TUBE CONNECT SUCTION CLEAR 120 X 1/4" (50EA/CA)"

## (undated) DEVICE — GLOVE BIOGEL ECLIPSE  PF LATEX SIZE 6.5 (50PR/BX)

## (undated) DEVICE — SUTURE 0 VICRYL PLUS CT-2 - 27 INCH (36/BX)

## (undated) DEVICE — GLOVE BIOGEL INDICATOR SZ 6.5 SURGICAL PF LTX - (50PR/BX 4BX/CA)

## (undated) DEVICE — MASK ANESTHESIA ADULT  - (100/CA)

## (undated) DEVICE — SUTURE 4-0 VICRYL PLUS FS-2 - 27 INCH (36/BX)

## (undated) DEVICE — SET SUCTION/IRRIGATION WITH DISPOSABLE TIP (6/CA )PART #0250-070-520 IS A SUB

## (undated) DEVICE — ELECTRODE DUAL RETURN W/ CORD - (50/PK)

## (undated) DEVICE — TUBING CLEARLINK DUO-VENT - C-FLO (48EA/CA)

## (undated) DEVICE — SUTURE GENERAL

## (undated) DEVICE — GLOVE SZ 7.5 BIOGEL PI MICRO - PF LF (50PR/BX)

## (undated) DEVICE — DRESSING TRANSPARENT FILM TEGADERM 2.375 X 2.75"  (100EA/BX)"

## (undated) DEVICE — LACTATED RINGERS INJ 1000 ML - (14EA/CA 60CA/PF)

## (undated) DEVICE — APPLIER ENDO MEDIUM CLIP (3EA/BX)

## (undated) DEVICE — TROCARCANN&SEAL 5X55 ZTHREAD - 12/BX

## (undated) DEVICE — BITE BLOCK ADULT 60FR (100EA/CA)

## (undated) DEVICE — GLOVE BIOGEL SZ 6.5 SURGICAL PF LTX (50PR/BX 4BX/CA)

## (undated) DEVICE — KIT ROOM DECONTAMINATION

## (undated) DEVICE — FILM CASSETTE ENDO

## (undated) DEVICE — GOWN WARMING STANDARD FLEX - (30/CA)

## (undated) DEVICE — SET LEADWIRE 5 LEAD BEDSIDE DISPOSABLE ECG (1SET OF 5/EA)

## (undated) DEVICE — CONTAINER, SPECIMEN, STERILE

## (undated) DEVICE — SODIUM CHL IRRIGATION 0.9% 1000ML (12EA/CA)

## (undated) DEVICE — DETERGENT RENUZYME PLUS 10 OZ PACKET (50/BX)

## (undated) DEVICE — SET EXTENSION WITH 2 PORTS (48EA/CA) ***PART #2C8610 IS A SUBSTITUTE*****

## (undated) DEVICE — GLOVE BIOGEL PI INDICATOR SZ 8.0 SURGICAL PF LF -(50/BX 4BX/CA)

## (undated) DEVICE — LEAD SET 6 DISP. EKG NIHON KOHDEN (100EA/CA) [9859].

## (undated) DEVICE — NEPTUNE 4 PORT MANIFOLD - (20/PK)

## (undated) DEVICE — SLEEVE, VASO, THIGH, MED

## (undated) DEVICE — BLANKET WARMING FULL BODY - (10/CA)

## (undated) DEVICE — TUBE E-T HI-LO CUFF 7.0MM (10EA/PK)

## (undated) DEVICE — PROTECTOR ULNA NERVE - (36PR/CA)

## (undated) DEVICE — SPHINCTERTOME TRUETOME 44 20MM PRELOAD JAG 035IN

## (undated) DEVICE — HEAD HOLDER JUNIOR/ADULT

## (undated) DEVICE — TUBING INSUFFLATION - (10/BX)

## (undated) DEVICE — EXTRACTOR PRO XL 12-15 MM ABOVE

## (undated) DEVICE — KIT CUSTOM PROCEDURE SINGLE FOR ENDO  (15/CA)

## (undated) DEVICE — SENSOR SPO2 NEO LNCS ADHESIVE (20/BX) SEE USER NOTES

## (undated) DEVICE — SUCTION INSTRUMENT YANKAUER BULBOUS TIP W/O VENT (50EA/CA)

## (undated) DEVICE — PACK LAP CHOLE OR - (2EA/CA)

## (undated) DEVICE — TRAY SKIN SCRUB PVP WET (20EA/CA) PART #DYND70356 DISCONTINUED

## (undated) DEVICE — SYRINGE DISP. 12 CC LL - (100/BX)

## (undated) DEVICE — LEAD SET 6 DISP. EKG NIHON KOHDEN (100EA/CA) [9859

## (undated) DEVICE — SYRINGE 30 ML LS (56/BX)

## (undated) DEVICE — KIT ANESTHESIA W/CIRCUIT & 3/LT BAG W/FILTER (20EA/CA)